# Patient Record
Sex: MALE | Race: WHITE | NOT HISPANIC OR LATINO | Employment: OTHER | URBAN - METROPOLITAN AREA
[De-identification: names, ages, dates, MRNs, and addresses within clinical notes are randomized per-mention and may not be internally consistent; named-entity substitution may affect disease eponyms.]

---

## 2023-03-24 PROBLEM — G89.29 CHRONIC RIGHT SHOULDER PAIN: Status: ACTIVE | Noted: 2023-03-24

## 2023-03-24 PROBLEM — K42.9 UMBILICAL HERNIA: Status: ACTIVE | Noted: 2023-03-24

## 2023-03-24 PROBLEM — K63.5 COLON POLYPS: Status: ACTIVE | Noted: 2023-03-24

## 2023-03-24 PROBLEM — K22.4 ESOPHAGEAL SPASM: Status: ACTIVE | Noted: 2023-03-24

## 2023-03-24 PROBLEM — I25.10 CORONARY ARTERY DISEASE: Status: ACTIVE | Noted: 2023-03-24

## 2023-03-24 PROBLEM — R91.1 PULMONARY NODULE, LEFT: Status: ACTIVE | Noted: 2023-03-24

## 2023-03-24 PROBLEM — R07.89 ATYPICAL CHEST PAIN: Status: ACTIVE | Noted: 2023-03-24

## 2023-03-24 PROBLEM — E78.2 HYPERLIPIDEMIA, MIXED: Status: ACTIVE | Noted: 2023-03-24

## 2023-03-24 PROBLEM — M25.511 CHRONIC RIGHT SHOULDER PAIN: Status: ACTIVE | Noted: 2023-03-24

## 2023-03-24 PROBLEM — M79.603 ARM PAIN: Status: ACTIVE | Noted: 2023-03-24

## 2023-03-24 PROBLEM — F17.200 TOBACCO DEPENDENCY: Status: ACTIVE | Noted: 2023-03-24

## 2023-03-24 PROBLEM — M50.10 HERNIATION OF CERVICAL INTERVERTEBRAL DISC WITH RADICULOPATHY: Status: ACTIVE | Noted: 2023-03-24

## 2023-03-24 PROBLEM — L30.9 DERMATITIS: Status: ACTIVE | Noted: 2023-03-24

## 2023-03-24 PROBLEM — R13.10 DYSPHAGIA: Status: ACTIVE | Noted: 2023-03-24

## 2023-03-24 PROBLEM — N28.1 RENAL CYST: Status: ACTIVE | Noted: 2023-03-24

## 2023-03-24 PROBLEM — J18.9 PNEUMONIA: Status: ACTIVE | Noted: 2023-03-24

## 2023-03-24 RX ORDER — BETAMETHASONE DIPROPIONATE 0.5 MG/G
OINTMENT TOPICAL 2 TIMES DAILY
COMMUNITY
Start: 2022-08-15 | End: 2023-03-29 | Stop reason: SDUPTHER

## 2023-03-24 RX ORDER — ATORVASTATIN CALCIUM 40 MG/1
40 TABLET, FILM COATED ORAL EVERY EVENING
COMMUNITY
Start: 2022-08-15 | End: 2023-03-29 | Stop reason: SDUPTHER

## 2023-03-24 RX ORDER — ASPIRIN 81 MG/1
81 TABLET ORAL EVERY MORNING
COMMUNITY
Start: 2017-10-02

## 2023-03-29 ENCOUNTER — LAB (OUTPATIENT)
Dept: LAB | Facility: LAB | Age: 63
End: 2023-03-29
Payer: COMMERCIAL

## 2023-03-29 ENCOUNTER — OFFICE VISIT (OUTPATIENT)
Dept: PRIMARY CARE | Facility: CLINIC | Age: 63
End: 2023-03-29
Payer: COMMERCIAL

## 2023-03-29 VITALS
BODY MASS INDEX: 30.96 KG/M2 | HEIGHT: 69 IN | HEART RATE: 80 BPM | OXYGEN SATURATION: 98 % | WEIGHT: 209 LBS | SYSTOLIC BLOOD PRESSURE: 112 MMHG | DIASTOLIC BLOOD PRESSURE: 82 MMHG

## 2023-03-29 DIAGNOSIS — R91.1 PULMONARY NODULE: Primary | ICD-10-CM

## 2023-03-29 DIAGNOSIS — E78.2 HYPERLIPIDEMIA, MIXED: ICD-10-CM

## 2023-03-29 DIAGNOSIS — L30.9 DERMATITIS: ICD-10-CM

## 2023-03-29 LAB
ANION GAP IN SER/PLAS: 13 MMOL/L (ref 10–20)
CALCIUM (MG/DL) IN SER/PLAS: 9.7 MG/DL (ref 8.6–10.6)
CARBON DIOXIDE, TOTAL (MMOL/L) IN SER/PLAS: 28 MMOL/L (ref 21–32)
CHLORIDE (MMOL/L) IN SER/PLAS: 102 MMOL/L (ref 98–107)
CHOLESTEROL (MG/DL) IN SER/PLAS: 124 MG/DL (ref 0–199)
CHOLESTEROL IN HDL (MG/DL) IN SER/PLAS: 31.2 MG/DL
CHOLESTEROL/HDL RATIO: 4
CREATININE (MG/DL) IN SER/PLAS: 0.92 MG/DL (ref 0.5–1.3)
ESTIMATED AVERAGE GLUCOSE FOR HBA1C: 123 MG/DL
GFR MALE: >90 ML/MIN/1.73M2
GLUCOSE (MG/DL) IN SER/PLAS: 87 MG/DL (ref 74–99)
HEMOGLOBIN A1C/HEMOGLOBIN TOTAL IN BLOOD: 5.9 %
LDL: 68 MG/DL (ref 0–99)
POTASSIUM (MMOL/L) IN SER/PLAS: 4 MMOL/L (ref 3.5–5.3)
SODIUM (MMOL/L) IN SER/PLAS: 139 MMOL/L (ref 136–145)
TRIGLYCERIDE (MG/DL) IN SER/PLAS: 122 MG/DL (ref 0–149)
UREA NITROGEN (MG/DL) IN SER/PLAS: 15 MG/DL (ref 6–23)
VLDL: 24 MG/DL (ref 0–40)

## 2023-03-29 PROCEDURE — 80061 LIPID PANEL: CPT

## 2023-03-29 PROCEDURE — 80048 BASIC METABOLIC PNL TOTAL CA: CPT

## 2023-03-29 PROCEDURE — 36415 COLL VENOUS BLD VENIPUNCTURE: CPT

## 2023-03-29 PROCEDURE — 99214 OFFICE O/P EST MOD 30 MIN: CPT | Performed by: INTERNAL MEDICINE

## 2023-03-29 PROCEDURE — 83036 HEMOGLOBIN GLYCOSYLATED A1C: CPT

## 2023-03-29 RX ORDER — KETOCONAZOLE 20 MG/G
CREAM TOPICAL
COMMUNITY
Start: 2022-10-26 | End: 2023-07-26 | Stop reason: ALTCHOICE

## 2023-03-29 RX ORDER — CLOBETASOL PROPIONATE 0.5 MG/G
CREAM TOPICAL
Qty: 60 G | Refills: 3 | Status: SHIPPED | OUTPATIENT
Start: 2023-03-29 | End: 2023-07-26 | Stop reason: ALTCHOICE

## 2023-03-29 RX ORDER — ATORVASTATIN CALCIUM 40 MG/1
40 TABLET, FILM COATED ORAL EVERY EVENING
Qty: 90 TABLET | Refills: 1 | Status: SHIPPED | OUTPATIENT
Start: 2023-03-29 | End: 2023-05-03

## 2023-03-29 RX ORDER — BETAMETHASONE DIPROPIONATE 0.5 MG/G
OINTMENT TOPICAL 2 TIMES DAILY
Qty: 45 G | Refills: 2 | Status: SHIPPED | OUTPATIENT
Start: 2023-03-29 | End: 2023-07-26 | Stop reason: ALTCHOICE

## 2023-03-29 RX ORDER — CLOBETASOL PROPIONATE 0.5 MG/G
CREAM TOPICAL
COMMUNITY
Start: 2022-10-26 | End: 2023-03-29 | Stop reason: SDUPTHER

## 2023-03-29 NOTE — PROGRESS NOTES
"Chief Complaint: Medicare Wellness Exam/Comprehensive Problem Focused Follow Up and Physical Exam    HPI:      Active Problem List      Comprehensive Medical/Surgical/Social/Family History  Past Medical History:   Diagnosis Date    Other specified postprocedural states 12/08/2022    H/O colonoscopy    Personal history of other diseases of the circulatory system     History of coronary artery disease    Personal history of other diseases of the digestive system 08/16/2022    History of umbilical hernia    Pure hypercholesterolemia, unspecified 11/26/2018    Pure hypercholesterolemia     Past Surgical History:   Procedure Laterality Date    OTHER SURGICAL HISTORY  08/12/2022    Circumcision    OTHER SURGICAL HISTORY  08/10/2022    Tonsillectomy     Social History     Social History Narrative    Not on file         Allergies and Medications  Patient has no known allergies.  Current Outpatient Medications on File Prior to Visit   Medication Sig Dispense Refill    aspirin 81 mg EC tablet Take 1 tablet (81 mg) by mouth once daily in the morning.      atorvastatin (Lipitor) 40 mg tablet Take 1 tablet (40 mg) by mouth once daily in the evening.      betamethasone dipropionate (Diprolene) 0.05 % ointment Apply topically twice a day. Sparingly to affected area(s)       No current facility-administered medications on file prior to visit.       Medications and Supplements  prescribed by me and other practitioners or clinical pharmacist (such as prescriptions, OTC's, herbal therapies and supplements) were reviewed and documented in the medical record.    Tobacco/Alcohol/Opioid use, as well as Illicit Drug Use was screened for/reviewed and documented in Social History section and medication list as appropriate  Activities of Daily Living  In your present state of health, do you have any difficulty performing the following activities?:   Preparing food and eating?: {yes/no (default no):240138::\"No\"}  Bathing yourself: {yes/no " "(default no):140031::\"No\"}  Getting dressed: {yes/no (default no):140031::\"No\"}  Using the toilet:{yes/no (default no):140031::\"No\"}  Moving around from place to place: {yes/no (default no):140031::\"No\"}  In the past year have you fallen or had a near fall?:{yes/no (default no):140031::\"No\"}    Depression Screen  (Note: if answer to either of the following is \"Yes\", then a more complete depression screening is indicated)   Q1: Over the past two weeks, have you felt down, depressed or hopeless? {yes/no (default no):140031::\"No\"}  Q2: Over the past two weeks, have you felt little interest or pleasure in doing things? {yes/no (default no):140031::\"No\"}    Current exercise habits: {exercise:19826}   Dietary issues discussed: Yes  Hearing difficulties: {yes/no (default no):140031::\"No\"}  Safe in current home environment: {yes/no free text:811418}  Visual Acuity assessed: {yes/no free text:439705}  Cognitive Impairment assessed: {yes/no free text:373318}       Advance directives  Advanced Care Planning (including a Living Will, Healthcare POA, as well as specific end of life choices and/or directives), was discussed for approximately 16 minutes with the patient and/or surrogate, voluntarily, and documented in the medical record.     Cardiac Risk Assessment  Cardiovascular risk was discussed and, if needed, lifestyle modifications recommended, including nutritional choices, exercise, and elimination of habits contributing to risk. We agreed on a plan to reduce the current cardiovascular risk based on above discussion as needed.  Aspirin use/disuse was discussed after reviewing the updated guidelines below:    Consider low dose Aspirin ( mg) use if the benefit for cardiovascular disease prevention outweighs risk for bleeding complications.   In general, low dose ASA should be considered:  In patients WITHOUT prior MI/stroke/PAD (primary prevention):   a. Age <60: Use if 10-year cardiovascular disease risk >20%, with " discussion of risks and benefits with patient  b. Age 60-<70: Use if 10-year cardiovascular disease risk >20% and low bleeding (e.g., gastrointenstinal) risk, with discussion of risks and benefits with patient  c. Age >=70: Do not use    In patients WITH prior MI/stroke/PAD (secondary prevention):   Generally use unless extremely high bleeding (e.g., gastrointenstinal) risk, with discussion of risks and benefits with patient    ROS otherwise negative aside from what was mentioned above in HPI.    Vitals  There were no vitals taken for this visit.  There is no height or weight on file to calculate BMI.  Physical Exam  Gen: Alert, NAD  HEENT:  PERRLA, EOMI, conjunctiva and sclera normal in appearance. External auditory canals/TMs normal; Oral cavity and posterior pharynx without lesions/exudate  Neck:  Supple with FROM; No masses/nodes palpable; Thyroid nontender and without nodules; No PHILIP  Respiratory:  Lungs CTAB  Cardiovascular:  Heart RRR. No M/R/G. Peripheral pulses equal bilaterally  Abdomen:  Soft, nontender, BS present throughout; No R/G/R; No HSM or masses palpated  Extremities:  FROM all extremities; Muscle strength grossly normal with good tone  Neuro:  CN II-XII intact; Reflexes 2+/2+; Gross motor and sensory intact  Skin:  No suspicious lesions present  Breast: No masses, skin lesions or nipple discharges, no axillary lymphadenopathy    Assessment and Plan:  Problem List Items Addressed This Visit    None          During the course of the visit the patient was educated and counseled about age appropriate screening and preventive services. Completed preventive screenings were documented in the chart and orders were placed for outstanding screenings/procedures as documented in the Assessment and Plan.      Patient Instructions (the written plan) was given to the patient at check out.      Sarah Kolb, [facility] complaint   ***    HPI  ***  Medications  ***    ROS  ***      There were no vitals taken  for this visit.    Physical Exam  ***    Assessment/Plan   {Assess/PlanSmartLinks:79034}

## 2023-03-29 NOTE — PROGRESS NOTES
Chief complaint   ***    HPI  ***  Medications  ***    ROS  ***      There were no vitals taken for this visit.    Physical Exam  ***    Assessment/Plan   {Assess/PlanSmartLinks:14666}

## 2023-03-29 NOTE — PROGRESS NOTES
"Chief complaint   Follow up  Med refill    HPI  Had colonoscopy upper and lower . Had esophageal stricture- no furhter symptoms.       ROS  Gen-no wt loss,wt gain,fevers,apnea at night,snoring  HEENT- no ear pain, no oral pain, no hearing loss no sinus pain or drainage  Pulm- no sob,weezing,cough,hemoptysis,productive mucous  Cardio-no cp,tyler,orthopnea,palpitations  Gi- no diarrhea,constipation,n,v,hematemesis,dysphagia,change in bowel habits  Endo- no polydipsia,polyuria,wt gain,extreme fatigue,polyphagia,blurred vision  Neuro-no ha,loc,paresthesias,transient limb weakness,acute memory loss,visual changes  Heme- no blood loss,transfusions,unusual bleeding from skin and/or mucosa  Urology- no dysuria,hematuria,urinary frequency,bladder pain  Psych- no depression,mood swings,anxiety,insomnia,hallucinations  Musculoskeletal- no joint aches / pains,muscle pains,new back pain  Vascular- no pain in calve w walkin,discolored painful toes        /82   Pulse 80   Ht 1.74 m (5' 8.5\")   Wt 94.8 kg (209 lb)   SpO2 98%   BMI 31.32 kg/m²     Physical Exam  NAD  HEENT- nl ear tm bilat,nl pharynx, mucosa moist,no icterus  Lungs clear, good AE, no wheezing  Heart-normal S1-S2 no murmur gallop rub, rhythm normal  Abdomen- soft, nontender, pos bowel sounds, Neck-no JVD, thyroid normal, no carotid bruits, no LN  Neuro alert and oriented ×3, cranial nerves II through XII normal, motor function symmetrical,sensory exam symmetrical ,gait nl  Extrem- no edema, no tenderness, no cords  Skin- 5mm irregular mole left nipple    Assessment/Plan   Diagnoses and all orders for this visit:  Dermatitis  Hyperlipidemia, mixed  Mole- see derm  Cardiac risks- high calcium score / see card/ check lipids  Abd US- risk factors    Return in 4 months    "

## 2023-03-30 NOTE — RESULT ENCOUNTER NOTE
Elevated hga1c which shows avg glucose readings as above nl . Your systems is glucose intolerant. Since you have made a lifestyle change lets continue that with weight loss. Another 15 pounds and repeat hga1c in 3 months. Congratulations on your progress , good news.

## 2023-04-14 ENCOUNTER — DOCUMENTATION (OUTPATIENT)
Dept: PRIMARY CARE | Facility: CLINIC | Age: 63
End: 2023-04-14
Payer: COMMERCIAL

## 2023-05-03 ENCOUNTER — PATIENT MESSAGE (OUTPATIENT)
Dept: PRIMARY CARE | Facility: CLINIC | Age: 63
End: 2023-05-03
Payer: COMMERCIAL

## 2023-05-03 DIAGNOSIS — E78.2 HYPERLIPIDEMIA, MIXED: Primary | ICD-10-CM

## 2023-05-03 RX ORDER — ATORVASTATIN CALCIUM 80 MG/1
80 TABLET, FILM COATED ORAL DAILY
Qty: 90 TABLET | Refills: 1 | Status: SHIPPED | OUTPATIENT
Start: 2023-05-03 | End: 2023-11-17

## 2023-05-26 ENCOUNTER — HOSPITAL ENCOUNTER (OUTPATIENT)
Dept: DATA CONVERSION | Facility: HOSPITAL | Age: 63
End: 2023-05-26
Attending: INTERNAL MEDICINE | Admitting: INTERNAL MEDICINE
Payer: COMMERCIAL

## 2023-05-26 DIAGNOSIS — I25.82 CHRONIC TOTAL OCCLUSION OF CORONARY ARTERY: ICD-10-CM

## 2023-05-26 DIAGNOSIS — I25.10 ATHEROSCLEROTIC HEART DISEASE OF NATIVE CORONARY ARTERY WITHOUT ANGINA PECTORIS: ICD-10-CM

## 2023-05-26 DIAGNOSIS — Z87.891 PERSONAL HISTORY OF NICOTINE DEPENDENCE: ICD-10-CM

## 2023-05-26 DIAGNOSIS — R94.39 ABNORMAL RESULT OF OTHER CARDIOVASCULAR FUNCTION STUDY: ICD-10-CM

## 2023-05-26 DIAGNOSIS — E78.5 HYPERLIPIDEMIA, UNSPECIFIED: ICD-10-CM

## 2023-05-26 DIAGNOSIS — I10 ESSENTIAL (PRIMARY) HYPERTENSION: ICD-10-CM

## 2023-05-26 LAB
ANION GAP IN SER/PLAS: 12 MMOL/L (ref 10–20)
CALCIUM (MG/DL) IN SER/PLAS: 9.7 MG/DL (ref 8.6–10.3)
CARBON DIOXIDE, TOTAL (MMOL/L) IN SER/PLAS: 29 MMOL/L (ref 21–32)
CHLORIDE (MMOL/L) IN SER/PLAS: 101 MMOL/L (ref 98–107)
CREATININE (MG/DL) IN SER/PLAS: 1.12 MG/DL (ref 0.5–1.3)
ERYTHROCYTE DISTRIBUTION WIDTH (RATIO) BY AUTOMATED COUNT: 13.2 % (ref 11.5–14.5)
ERYTHROCYTE MEAN CORPUSCULAR HEMOGLOBIN CONCENTRATION (G/DL) BY AUTOMATED: 32.6 G/DL (ref 32–36)
ERYTHROCYTE MEAN CORPUSCULAR VOLUME (FL) BY AUTOMATED COUNT: 93 FL (ref 80–100)
ERYTHROCYTES (10*6/UL) IN BLOOD BY AUTOMATED COUNT: 4.62 X10E12/L (ref 4.5–5.9)
GFR MALE: 74 ML/MIN/1.73M2
GLUCOSE (MG/DL) IN SER/PLAS: 96 MG/DL (ref 74–99)
HEMATOCRIT (%) IN BLOOD BY AUTOMATED COUNT: 43 % (ref 41–52)
HEMOGLOBIN (G/DL) IN BLOOD: 14 G/DL (ref 13.5–17.5)
LEUKOCYTES (10*3/UL) IN BLOOD BY AUTOMATED COUNT: 5.5 X10E9/L (ref 4.4–11.3)
PLATELETS (10*3/UL) IN BLOOD AUTOMATED COUNT: 230 X10E9/L (ref 150–450)
POTASSIUM (MMOL/L) IN SER/PLAS: 4.4 MMOL/L (ref 3.5–5.3)
SODIUM (MMOL/L) IN SER/PLAS: 138 MMOL/L (ref 136–145)
UREA NITROGEN (MG/DL) IN SER/PLAS: 12 MG/DL (ref 6–23)

## 2023-06-15 LAB
ACTIVATED PARTIAL THROMBOPLASTIN TIME IN PPP BY COAGULATION ASSAY: 34 SEC (ref 26–39)
ALANINE AMINOTRANSFERASE (SGPT) (U/L) IN SER/PLAS: 23 U/L (ref 10–52)
ALBUMIN (G/DL) IN SER/PLAS: 4.3 G/DL (ref 3.4–5)
ALKALINE PHOSPHATASE (U/L) IN SER/PLAS: 80 U/L (ref 33–136)
ANION GAP IN SER/PLAS: 12 MMOL/L (ref 10–20)
APPEARANCE, URINE: CLEAR
ASPARTATE AMINOTRANSFERASE (SGOT) (U/L) IN SER/PLAS: 19 U/L (ref 9–39)
BASOPHILS (10*3/UL) IN BLOOD BY AUTOMATED COUNT: 0.05 X10E9/L (ref 0–0.1)
BASOPHILS/100 LEUKOCYTES IN BLOOD BY AUTOMATED COUNT: 0.9 % (ref 0–2)
BILIRUBIN TOTAL (MG/DL) IN SER/PLAS: 0.6 MG/DL (ref 0–1.2)
BILIRUBIN, URINE: NEGATIVE
BLOOD, URINE: NEGATIVE
C REACTIVE PROTEIN (MG/L) IN SER/PLAS: 0.1 MG/DL
CALCIUM (MG/DL) IN SER/PLAS: 9.3 MG/DL (ref 8.6–10.3)
CARBON DIOXIDE, TOTAL (MMOL/L) IN SER/PLAS: 26 MMOL/L (ref 21–32)
CHLORIDE (MMOL/L) IN SER/PLAS: 103 MMOL/L (ref 98–107)
COLOR, URINE: ABNORMAL
CREATININE (MG/DL) IN SER/PLAS: 0.95 MG/DL (ref 0.5–1.3)
EOSINOPHILS (10*3/UL) IN BLOOD BY AUTOMATED COUNT: 0.1 X10E9/L (ref 0–0.7)
EOSINOPHILS/100 LEUKOCYTES IN BLOOD BY AUTOMATED COUNT: 1.8 % (ref 0–6)
ERYTHROCYTE DISTRIBUTION WIDTH (RATIO) BY AUTOMATED COUNT: 12.9 % (ref 11.5–14.5)
ERYTHROCYTE MEAN CORPUSCULAR HEMOGLOBIN CONCENTRATION (G/DL) BY AUTOMATED: 33.3 G/DL (ref 32–36)
ERYTHROCYTE MEAN CORPUSCULAR VOLUME (FL) BY AUTOMATED COUNT: 91 FL (ref 80–100)
ERYTHROCYTES (10*6/UL) IN BLOOD BY AUTOMATED COUNT: 4.45 X10E12/L (ref 4.5–5.9)
GFR MALE: 90 ML/MIN/1.73M2
GLUCOSE (MG/DL) IN SER/PLAS: 82 MG/DL (ref 74–99)
GLUCOSE, URINE: NEGATIVE MG/DL
HEMATOCRIT (%) IN BLOOD BY AUTOMATED COUNT: 40.5 % (ref 41–52)
HEMOGLOBIN (G/DL) IN BLOOD: 13.5 G/DL (ref 13.5–17.5)
IMMATURE GRANULOCYTES/100 LEUKOCYTES IN BLOOD BY AUTOMATED COUNT: 0.2 % (ref 0–0.9)
INR IN PPP BY COAGULATION ASSAY: 1 (ref 0.9–1.1)
KETONES, URINE: NEGATIVE MG/DL
LEUKOCYTE ESTERASE, URINE: NEGATIVE
LEUKOCYTES (10*3/UL) IN BLOOD BY AUTOMATED COUNT: 5.6 X10E9/L (ref 4.4–11.3)
LYMPHOCYTES (10*3/UL) IN BLOOD BY AUTOMATED COUNT: 2.47 X10E9/L (ref 1.2–4.8)
LYMPHOCYTES/100 LEUKOCYTES IN BLOOD BY AUTOMATED COUNT: 43.8 % (ref 13–44)
MONOCYTES (10*3/UL) IN BLOOD BY AUTOMATED COUNT: 0.58 X10E9/L (ref 0.1–1)
MONOCYTES/100 LEUKOCYTES IN BLOOD BY AUTOMATED COUNT: 10.3 % (ref 2–10)
NEUTROPHILS (10*3/UL) IN BLOOD BY AUTOMATED COUNT: 2.43 X10E9/L (ref 1.2–7.7)
NEUTROPHILS/100 LEUKOCYTES IN BLOOD BY AUTOMATED COUNT: 43 % (ref 40–80)
NITRITE, URINE: NEGATIVE
PH, URINE: 6 (ref 5–8)
PLATELETS (10*3/UL) IN BLOOD AUTOMATED COUNT: 210 X10E9/L (ref 150–450)
POTASSIUM (MMOL/L) IN SER/PLAS: 4 MMOL/L (ref 3.5–5.3)
PROTEIN TOTAL: 7.2 G/DL (ref 6.4–8.2)
PROTEIN, URINE: NEGATIVE MG/DL
PROTHROMBIN TIME (PT) IN PPP BY COAGULATION ASSAY: 11.6 SEC (ref 9.8–13.4)
SODIUM (MMOL/L) IN SER/PLAS: 137 MMOL/L (ref 136–145)
SPECIFIC GRAVITY, URINE: 1 (ref 1–1.03)
UREA NITROGEN (MG/DL) IN SER/PLAS: 12 MG/DL (ref 6–23)
UROBILINOGEN, URINE: <2 MG/DL (ref 0–1.9)

## 2023-06-17 LAB — STAPH/MRSA SCREEN, CULTURE: NORMAL

## 2023-06-25 ENCOUNTER — TELEPHONE (OUTPATIENT)
Dept: PRIMARY CARE | Facility: CLINIC | Age: 63
End: 2023-06-25
Payer: COMMERCIAL

## 2023-06-25 DIAGNOSIS — F41.9 ANXIETY: Primary | ICD-10-CM

## 2023-06-25 RX ORDER — LORAZEPAM 1 MG/1
1 TABLET ORAL 2 TIMES DAILY PRN
Qty: 30 TABLET | Refills: 3 | Status: SHIPPED | OUTPATIENT
Start: 2023-06-25 | End: 2023-06-26 | Stop reason: SDUPTHER

## 2023-06-26 DIAGNOSIS — F41.9 ANXIETY: Primary | ICD-10-CM

## 2023-06-26 RX ORDER — LORAZEPAM 1 MG/1
1 TABLET ORAL 2 TIMES DAILY PRN
Qty: 30 TABLET | Refills: 3 | Status: SHIPPED | OUTPATIENT
Start: 2023-06-26 | End: 2023-07-26 | Stop reason: ALTCHOICE

## 2023-07-10 ENCOUNTER — DOCUMENTATION (OUTPATIENT)
Dept: PRIMARY CARE | Facility: CLINIC | Age: 63
End: 2023-07-10
Payer: COMMERCIAL

## 2023-07-10 ENCOUNTER — PATIENT OUTREACH (OUTPATIENT)
Dept: CARE COORDINATION | Facility: CLINIC | Age: 63
End: 2023-07-10
Payer: COMMERCIAL

## 2023-07-10 RX ORDER — OXYCODONE HYDROCHLORIDE 5 MG/1
TABLET ORAL EVERY 6 HOURS PRN
COMMUNITY
Start: 2023-07-09 | End: 2023-07-11

## 2023-07-10 RX ORDER — FUROSEMIDE 20 MG/1
1 TABLET ORAL DAILY
Qty: 6 TABLET | Refills: 0 | COMMUNITY
Start: 2023-07-09 | End: 2023-07-26 | Stop reason: ALTCHOICE

## 2023-07-10 RX ORDER — METOPROLOL TARTRATE 25 MG/1
25 TABLET, FILM COATED ORAL 2 TIMES DAILY
COMMUNITY
Start: 2023-05-19 | End: 2024-02-21 | Stop reason: ALTCHOICE

## 2023-07-10 RX ORDER — POTASSIUM CHLORIDE 750 MG/1
10 TABLET, FILM COATED, EXTENDED RELEASE ORAL DAILY
COMMUNITY
Start: 2023-07-09 | End: 2023-07-15

## 2023-07-10 RX ORDER — IRON POLYSACCHARIDE COMPLEX 150 MG
150 CAPSULE ORAL DAILY
COMMUNITY
Start: 2023-07-09 | End: 2023-08-07

## 2023-07-10 RX ORDER — MULTIVIT-MIN/IRON FUM/FOLIC AC 7.5 MG-4
1 TABLET ORAL DAILY
Qty: 29 TABLET | Refills: 0 | COMMUNITY
Start: 2023-07-09 | End: 2023-08-07

## 2023-07-10 RX ORDER — ISOSORBIDE MONONITRATE 30 MG/1
30 TABLET, EXTENDED RELEASE ORAL DAILY
COMMUNITY
Start: 2023-07-09 | End: 2024-02-21 | Stop reason: ALTCHOICE

## 2023-07-10 RX ORDER — AMOXICILLIN 250 MG
1 CAPSULE ORAL DAILY
COMMUNITY
Start: 2023-07-09 | End: 2023-07-26 | Stop reason: ALTCHOICE

## 2023-07-10 NOTE — PROGRESS NOTES
Discharge Facility:  [facility]  Discharge DiagnosisCABG x3   Admission Date: 7/5/23  Discharge Date: 7/9/23    PCP Appointment Date: 7/26/23  Specialist Appointment Date: 7/20/23  Hospital Encounter and Summary: Not available  See discharge assessment below for further details    Engagement  Call Start Time: 1255 (7/10/2023  1:07 PM)    Medications  Medications reviewed with patient/caregiver?: Yes (7/10/2023  1:07 PM)  Is the patient having any side effects they believe may be caused by any medication additions or changes?: No (7/10/2023  1:07 PM)  Does the patient have all medications ordered at discharge?: No (7/10/2023  1:07 PM)  What is keeping the patient from filling the prescriptions?: -- (pending lasix and isosorbide) (7/10/2023  1:07 PM)  Prescription Comments: He received all medications except for isosorbide and lasix, will be getting those this afternoon (7/10/2023  1:07 PM)  Is the patient taking all medications as directed (includes completed medication regime)?: Yes (7/10/2023  1:07 PM)    Appointments  Does the patient have a primary care provider?: Yes (7/10/2023  1:07 PM)  Care Management Interventions: Verified appointment date/time/provider (7/10/2023  1:07 PM)  Has the patient kept scheduled appointments due by today?: Yes (7/10/2023  1:07 PM)  Care Management Interventions: Educated on importance of keeping appointment (7/10/2023  1:07 PM)    Self Management  What is the home health agency?: [facility] (7/10/2023  1:07 PM)  Has home health visited the patient within 72 hours of discharge?: Call prior to 72 hours (7/10/2023  1:07 PM)    Patient Teaching  Does the patient have access to their discharge instructions?: Yes (7/10/2023  1:07 PM)  Care Management Interventions: Reviewed instructions with patient (7/10/2023  1:07 PM)  What is the patient's perception of their health status since discharge?: Improving (7/10/2023  1:07 PM)  Is the patient/caregiver able to teach back the hierarchy of who to  call/visit for symptoms/problems? PCP, Specialist, Home Health nurse, Urgent Care, ED, 911: Yes (7/10/2023  1:07 PM)  Patient/Caregiver Education Comments: Aware of activity limitations and no questions on discharge instructions (7/10/2023  1:07 PM)    Wrap Up  Wrap Up Additional Comments: will keep appt with Dr Kolb for hosp fu (7/10/2023  1:07 PM)  Call End Time: 1314 (7/10/2023  1:07 PM)

## 2023-07-11 LAB
ERYTHROCYTE DISTRIBUTION WIDTH (RATIO) BY AUTOMATED COUNT: 13.9 % (ref 11.5–14.5)
ERYTHROCYTE MEAN CORPUSCULAR HEMOGLOBIN CONCENTRATION (G/DL) BY AUTOMATED: 31.6 G/DL (ref 32–36)
ERYTHROCYTE MEAN CORPUSCULAR VOLUME (FL) BY AUTOMATED COUNT: 94 FL (ref 80–100)
ERYTHROCYTES (10*6/UL) IN BLOOD BY AUTOMATED COUNT: 2.18 X10E12/L (ref 4.5–5.9)
HEMATOCRIT (%) IN BLOOD BY AUTOMATED COUNT: 20.6 % (ref 41–52)
HEMOGLOBIN (G/DL) IN BLOOD: 6.5 G/DL (ref 13.5–17.5)
LEUKOCYTES (10*3/UL) IN BLOOD BY AUTOMATED COUNT: 9.5 X10E9/L (ref 4.4–11.3)
NRBC (PER 100 WBCS) BY AUTOMATED COUNT: 1.7 /100 WBC (ref 0–0)
PLATELETS (10*3/UL) IN BLOOD AUTOMATED COUNT: 269 X10E9/L (ref 150–450)

## 2023-07-13 ENCOUNTER — DOCUMENTATION (OUTPATIENT)
Dept: PRIMARY CARE | Facility: CLINIC | Age: 63
End: 2023-07-13
Payer: COMMERCIAL

## 2023-07-13 ENCOUNTER — PATIENT OUTREACH (OUTPATIENT)
Dept: CARE COORDINATION | Facility: CLINIC | Age: 63
End: 2023-07-13
Payer: COMMERCIAL

## 2023-07-13 NOTE — PROGRESS NOTES
Discharge Facility: [facility]  Discharge Diagnosis: post op anemia  Admission Date: 7/11/23  Discharge Date: 7/12/23    PCP Appointment Date: 7/26/23  Hospital Encounter and Summary: Not available  See discharge assessment below for further details    Engagement  Call Start Time: 1520 (7/13/2023  3:22 PM)    Medications  Medications reviewed with patient/caregiver?: Yes (7/13/2023  3:22 PM)  Is the patient having any side effects they believe may be caused by any medication additions or changes?: No (7/13/2023  3:22 PM)  Does the patient have all medications ordered at discharge?: Yes (7/13/2023  3:22 PM)  What is keeping the patient from filling the prescriptions?: -- (pending lasix and isosorbide) (7/10/2023  1:07 PM)  Prescription Comments: Stopped isosorbide (7/13/2023  3:22 PM)  Is the patient taking all medications as directed (includes completed medication regime)?: Yes (7/13/2023  3:22 PM)    Appointments  Does the patient have a primary care provider?: Yes (7/13/2023  3:22 PM)  Care Management Interventions: Verified appointment date/time/provider (7/13/2023  3:22 PM)  Has the patient kept scheduled appointments due by today?: Yes (7/13/2023  3:22 PM)  Care Management Interventions: Educated on importance of keeping appointment (7/13/2023  3:22 PM)    Self Management  What is the home health agency?: [facility] (7/13/2023  3:22 PM)  Has home health visited the patient within 72 hours of discharge?: Call prior to 72 hours (7/13/2023  3:22 PM)    Patient Teaching  Does the patient have access to their discharge instructions?: Yes (7/13/2023  3:22 PM)  Care Management Interventions: Reviewed instructions with patient (7/13/2023  3:22 PM)  What is the patient's perception of their health status since discharge?: Returned to baseline/stable (7/13/2023  3:22 PM)  Is the patient/caregiver able to teach back the hierarchy of who to call/visit for symptoms/problems? PCP, Specialist, Home Health nurse, Urgent Care, ED,  911: Yes (7/13/2023  3:22 PM)  Patient/Caregiver Education Comments: Aware of activity limitations and no questions on discharge instructions (7/10/2023  1:07 PM)    Wrap Up  Wrap Up Additional Comments: Advised to call cardiology with any new symptoms of shortness of breath, swelling, weight gain (7/13/2023  3:22 PM)  Call End Time: 1525 (7/13/2023  3:22 PM)

## 2023-07-26 ENCOUNTER — LAB (OUTPATIENT)
Dept: LAB | Facility: LAB | Age: 63
End: 2023-07-26
Payer: COMMERCIAL

## 2023-07-26 ENCOUNTER — OFFICE VISIT (OUTPATIENT)
Dept: PRIMARY CARE | Facility: CLINIC | Age: 63
End: 2023-07-26
Payer: COMMERCIAL

## 2023-07-26 VITALS
BODY MASS INDEX: 29.7 KG/M2 | WEIGHT: 196 LBS | OXYGEN SATURATION: 98 % | HEART RATE: 64 BPM | HEIGHT: 68 IN | SYSTOLIC BLOOD PRESSURE: 110 MMHG | DIASTOLIC BLOOD PRESSURE: 64 MMHG

## 2023-07-26 DIAGNOSIS — I25.700 ATHEROSCLEROSIS OF CORONARY ARTERY BYPASS GRAFT OF NATIVE HEART WITH UNSTABLE ANGINA PECTORIS (MULTI): Primary | ICD-10-CM

## 2023-07-26 DIAGNOSIS — D64.9 ANEMIA, UNSPECIFIED TYPE: ICD-10-CM

## 2023-07-26 PROBLEM — B34.9 VIRAL ILLNESS: Status: RESOLVED | Noted: 2023-07-26 | Resolved: 2023-07-26

## 2023-07-26 PROBLEM — I25.110: Status: ACTIVE | Noted: 2023-03-24

## 2023-07-26 PROBLEM — J18.9 PNEUMONIA: Status: RESOLVED | Noted: 2023-03-24 | Resolved: 2023-07-26

## 2023-07-26 PROBLEM — I65.29 CAROTID STENOSIS: Status: ACTIVE | Noted: 2023-07-26

## 2023-07-26 PROBLEM — L30.9 DERMATITIS: Status: RESOLVED | Noted: 2023-03-24 | Resolved: 2023-07-26

## 2023-07-26 PROBLEM — F17.200 SMOKER: Status: ACTIVE | Noted: 2023-07-26

## 2023-07-26 PROBLEM — Z95.1 S/P CABG (CORONARY ARTERY BYPASS GRAFT): Status: ACTIVE | Noted: 2023-07-26

## 2023-07-26 PROBLEM — M79.603 ARM PAIN: Status: RESOLVED | Noted: 2023-03-24 | Resolved: 2023-07-26

## 2023-07-26 PROBLEM — F17.200 TOBACCO DEPENDENCY: Status: RESOLVED | Noted: 2023-03-24 | Resolved: 2023-07-26

## 2023-07-26 PROBLEM — F17.200 SMOKER: Status: RESOLVED | Noted: 2023-07-26 | Resolved: 2023-07-26

## 2023-07-26 LAB
BASOPHILS (10*3/UL) IN BLOOD BY AUTOMATED COUNT: 0.03 X10E9/L (ref 0–0.1)
BASOPHILS/100 LEUKOCYTES IN BLOOD BY AUTOMATED COUNT: 0.7 % (ref 0–2)
EOSINOPHILS (10*3/UL) IN BLOOD BY AUTOMATED COUNT: 0.13 X10E9/L (ref 0–0.7)
EOSINOPHILS/100 LEUKOCYTES IN BLOOD BY AUTOMATED COUNT: 3.1 % (ref 0–6)
ERYTHROCYTE DISTRIBUTION WIDTH (RATIO) BY AUTOMATED COUNT: 15.7 % (ref 11.5–14.5)
ERYTHROCYTE MEAN CORPUSCULAR HEMOGLOBIN CONCENTRATION (G/DL) BY AUTOMATED: 30.7 G/DL (ref 32–36)
ERYTHROCYTE MEAN CORPUSCULAR VOLUME (FL) BY AUTOMATED COUNT: 93 FL (ref 80–100)
ERYTHROCYTES (10*6/UL) IN BLOOD BY AUTOMATED COUNT: 3.63 X10E12/L (ref 4.5–5.9)
HEMATOCRIT (%) IN BLOOD BY AUTOMATED COUNT: 33.9 % (ref 41–52)
HEMOGLOBIN (G/DL) IN BLOOD: 10.4 G/DL (ref 13.5–17.5)
IMMATURE GRANULOCYTES/100 LEUKOCYTES IN BLOOD BY AUTOMATED COUNT: 0.5 % (ref 0–0.9)
LEUKOCYTES (10*3/UL) IN BLOOD BY AUTOMATED COUNT: 4.1 X10E9/L (ref 4.4–11.3)
LYMPHOCYTES (10*3/UL) IN BLOOD BY AUTOMATED COUNT: 1.3 X10E9/L (ref 1.2–4.8)
LYMPHOCYTES/100 LEUKOCYTES IN BLOOD BY AUTOMATED COUNT: 31.4 % (ref 13–44)
MONOCYTES (10*3/UL) IN BLOOD BY AUTOMATED COUNT: 0.57 X10E9/L (ref 0.1–1)
MONOCYTES/100 LEUKOCYTES IN BLOOD BY AUTOMATED COUNT: 13.8 % (ref 2–10)
NEUTROPHILS (10*3/UL) IN BLOOD BY AUTOMATED COUNT: 2.09 X10E9/L (ref 1.2–7.7)
NEUTROPHILS/100 LEUKOCYTES IN BLOOD BY AUTOMATED COUNT: 50.5 % (ref 40–80)
NRBC (PER 100 WBCS) BY AUTOMATED COUNT: 0 /100 WBC (ref 0–0)
PLATELETS (10*3/UL) IN BLOOD AUTOMATED COUNT: 471 X10E9/L (ref 150–450)

## 2023-07-26 PROCEDURE — 99214 OFFICE O/P EST MOD 30 MIN: CPT | Performed by: INTERNAL MEDICINE

## 2023-07-26 PROCEDURE — 36415 COLL VENOUS BLD VENIPUNCTURE: CPT

## 2023-07-26 PROCEDURE — 85025 COMPLETE CBC W/AUTO DIFF WBC: CPT

## 2023-07-26 RX ORDER — ONDANSETRON 4 MG/1
TABLET, FILM COATED ORAL
COMMUNITY
Start: 2023-07-09 | End: 2023-07-26 | Stop reason: ALTCHOICE

## 2023-07-26 RX ORDER — ACETAMINOPHEN 500 MG
500 TABLET ORAL 2 TIMES DAILY
COMMUNITY
Start: 2023-07-11

## 2023-07-26 RX ORDER — VITAMIN E (DL,TOCOPHERYL ACET) 90 MG
CAPSULE ORAL
COMMUNITY
Start: 2023-07-11

## 2023-07-26 RX ORDER — MULTIVIT-MIN/FERROUS SULFATE 4.5 MG
1 TABLET ORAL DAILY
COMMUNITY
Start: 2023-07-09 | End: 2023-07-26 | Stop reason: SDUPTHER

## 2023-07-26 NOTE — PROGRESS NOTES
"Subjective   Patient ID: Maynor Barth is a 62 y.o. male who presents for No chief complaint on file..  HPI  3 wks ago had cabg, then readmitted for severe anemia  Here with wife. Now able to sleep and walking more. Appetite is good. No longer smoking or drinking.                   ROS:      General: denies fever/chills/weight loss      Head: denies HA/trauma/masses/dizziness      Eyes: denies vision change/loss of vision/blurry vision/diplopia/eye pain      Ears: denies hearing loss/tinnitus/otalgia/otorrhea      Nose: denies nasal drainage/anosmia      Throat: denies dysphagia/odynophagia      Lymphatics: denies lymph node swelling      Cardiac: denies CP/palpitations/orthopnea/PND      Pulmonary: denies dyspnea/cough/wheezing      GI: denies abd pain/n/v/diarrhea/melena/hematochezia/hematemesis      : denies dysuria/hematuria/change frequency      Genital: denies genital discharge/lesions      Skin: denies rashes/lesions/masses      MSK: denies weakness/swelling/edema/gait imbalance/pain      Neuro: denies paresthesias/seizures/dysarthria      Psych: denies depression/anxiety/suicidal or homicidal ideations            Objective   /64   Pulse 64   Ht 1.727 m (5' 8\")   Wt 88.9 kg (196 lb)   SpO2 98%   BMI 29.80 kg/m²      Physical Exam:     General: AO3, NAD        Pulm: CTA b/l, no wheeze/rhonchi/rales. nonlabored     Cardiac: RRR +s1s2, no m/r/g.      GI: soft, NT/ND. Normoactive Bsx4. No rebound/guarding.     Rectal: no examined     MSK: 5/5 strength UE LE. No edema/clubbing/cyanosis     Skin: no rashes/lesions     Vascular: 2+ palp DP PT radials b/l. Negative carotid bruit     Neuro: CNII-XII intact. No focal deficits. Reflexes 2/4 brachioradialis bicep tricep patellar achilles. Finger to nose intact.     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 Will ask about imdur duration.   No new issues.    Sarah Kolb MD

## 2023-07-28 ENCOUNTER — PATIENT OUTREACH (OUTPATIENT)
Dept: CARE COORDINATION | Facility: CLINIC | Age: 63
End: 2023-07-28
Payer: COMMERCIAL

## 2023-07-28 NOTE — PROGRESS NOTES
Call regarding appt. with PCP on 7/26/23 after hospitalization.  At time of outreach call the patient feels as if their condition has improved since last visit.  Reviewed the PCP appointment with the pt and addressed any questions or concerns. Reviewed labwork with patient and explained his lower counts of his RBC, hemoglobin and hematocrit and other counts are expected due to post op anemia and are trending in the desired direction.

## 2023-08-02 LAB
CHOLESTEROL (MG/DL) IN SER/PLAS: 73 MG/DL (ref 0–199)
CHOLESTEROL IN HDL (MG/DL) IN SER/PLAS: 28.5 MG/DL
CHOLESTEROL/HDL RATIO: 2.6
LDL: 26 MG/DL (ref 0–99)
TRIGLYCERIDE (MG/DL) IN SER/PLAS: 93 MG/DL (ref 0–149)
VLDL: 19 MG/DL (ref 0–40)

## 2023-08-31 ENCOUNTER — PATIENT OUTREACH (OUTPATIENT)
Dept: CARE COORDINATION | Facility: CLINIC | Age: 63
End: 2023-08-31
Payer: COMMERCIAL

## 2023-09-07 VITALS — BODY MASS INDEX: 30.37 KG/M2 | HEIGHT: 68 IN | WEIGHT: 200.4 LBS

## 2023-09-12 DIAGNOSIS — D50.9 IRON DEFICIENCY ANEMIA, UNSPECIFIED IRON DEFICIENCY ANEMIA TYPE: Primary | ICD-10-CM

## 2023-09-17 DIAGNOSIS — R19.5 STOOL COLOR ABNORMAL: Primary | ICD-10-CM

## 2023-09-19 ENCOUNTER — LAB (OUTPATIENT)
Dept: LAB | Facility: LAB | Age: 63
End: 2023-09-19
Payer: COMMERCIAL

## 2023-09-19 DIAGNOSIS — R19.5 STOOL COLOR ABNORMAL: ICD-10-CM

## 2023-09-19 DIAGNOSIS — D50.9 IRON DEFICIENCY ANEMIA, UNSPECIFIED IRON DEFICIENCY ANEMIA TYPE: ICD-10-CM

## 2023-09-19 LAB
ALANINE AMINOTRANSFERASE (SGPT) (U/L) IN SER/PLAS: 23 U/L (ref 10–52)
ALBUMIN (G/DL) IN SER/PLAS: 4.5 G/DL (ref 3.4–5)
ALKALINE PHOSPHATASE (U/L) IN SER/PLAS: 74 U/L (ref 33–136)
AMYLASE (U/L) IN SER/PLAS: 55 U/L (ref 29–103)
ANION GAP IN SER/PLAS: 12 MMOL/L (ref 10–20)
ASPARTATE AMINOTRANSFERASE (SGOT) (U/L) IN SER/PLAS: 20 U/L (ref 9–39)
BASOPHILS (10*3/UL) IN BLOOD BY AUTOMATED COUNT: 0.04 X10E9/L (ref 0–0.1)
BASOPHILS/100 LEUKOCYTES IN BLOOD BY AUTOMATED COUNT: 0.8 % (ref 0–2)
BILIRUBIN TOTAL (MG/DL) IN SER/PLAS: 0.4 MG/DL (ref 0–1.2)
CALCIUM (MG/DL) IN SER/PLAS: 9.5 MG/DL (ref 8.6–10.6)
CARBON DIOXIDE, TOTAL (MMOL/L) IN SER/PLAS: 27 MMOL/L (ref 21–32)
CHLORIDE (MMOL/L) IN SER/PLAS: 105 MMOL/L (ref 98–107)
CREATININE (MG/DL) IN SER/PLAS: 0.91 MG/DL (ref 0.5–1.3)
EOSINOPHILS (10*3/UL) IN BLOOD BY AUTOMATED COUNT: 0.09 X10E9/L (ref 0–0.7)
EOSINOPHILS/100 LEUKOCYTES IN BLOOD BY AUTOMATED COUNT: 1.8 % (ref 0–6)
ERYTHROCYTE DISTRIBUTION WIDTH (RATIO) BY AUTOMATED COUNT: 15.9 % (ref 11.5–14.5)
ERYTHROCYTE MEAN CORPUSCULAR HEMOGLOBIN CONCENTRATION (G/DL) BY AUTOMATED: 31.5 G/DL (ref 32–36)
ERYTHROCYTE MEAN CORPUSCULAR VOLUME (FL) BY AUTOMATED COUNT: 88 FL (ref 80–100)
ERYTHROCYTES (10*6/UL) IN BLOOD BY AUTOMATED COUNT: 4.57 X10E12/L (ref 4.5–5.9)
GFR MALE: >90 ML/MIN/1.73M2
GLUCOSE (MG/DL) IN SER/PLAS: 101 MG/DL (ref 74–99)
HEMATOCRIT (%) IN BLOOD BY AUTOMATED COUNT: 40 % (ref 41–52)
HEMOGLOBIN (G/DL) IN BLOOD: 12.6 G/DL (ref 13.5–17.5)
IMMATURE GRANULOCYTES/100 LEUKOCYTES IN BLOOD BY AUTOMATED COUNT: 0.2 % (ref 0–0.9)
LEUKOCYTES (10*3/UL) IN BLOOD BY AUTOMATED COUNT: 5 X10E9/L (ref 4.4–11.3)
LIPASE (U/L) IN SER/PLAS: 58 U/L (ref 9–82)
LYMPHOCYTES (10*3/UL) IN BLOOD BY AUTOMATED COUNT: 2.04 X10E9/L (ref 1.2–4.8)
LYMPHOCYTES/100 LEUKOCYTES IN BLOOD BY AUTOMATED COUNT: 41.1 % (ref 13–44)
MONOCYTES (10*3/UL) IN BLOOD BY AUTOMATED COUNT: 0.52 X10E9/L (ref 0.1–1)
MONOCYTES/100 LEUKOCYTES IN BLOOD BY AUTOMATED COUNT: 10.5 % (ref 2–10)
NEUTROPHILS (10*3/UL) IN BLOOD BY AUTOMATED COUNT: 2.26 X10E9/L (ref 1.2–7.7)
NEUTROPHILS/100 LEUKOCYTES IN BLOOD BY AUTOMATED COUNT: 45.6 % (ref 40–80)
NRBC (PER 100 WBCS) BY AUTOMATED COUNT: 0 /100 WBC (ref 0–0)
PLATELETS (10*3/UL) IN BLOOD AUTOMATED COUNT: 209 X10E9/L (ref 150–450)
POTASSIUM (MMOL/L) IN SER/PLAS: 4.3 MMOL/L (ref 3.5–5.3)
PROTEIN TOTAL: 6.8 G/DL (ref 6.4–8.2)
SODIUM (MMOL/L) IN SER/PLAS: 140 MMOL/L (ref 136–145)
UREA NITROGEN (MG/DL) IN SER/PLAS: 15 MG/DL (ref 6–23)

## 2023-09-19 PROCEDURE — 82150 ASSAY OF AMYLASE: CPT

## 2023-09-19 PROCEDURE — 80053 COMPREHEN METABOLIC PANEL: CPT

## 2023-09-19 PROCEDURE — 36415 COLL VENOUS BLD VENIPUNCTURE: CPT

## 2023-09-19 PROCEDURE — 83690 ASSAY OF LIPASE: CPT

## 2023-09-19 PROCEDURE — 85025 COMPLETE CBC W/AUTO DIFF WBC: CPT

## 2023-09-20 DIAGNOSIS — R19.5 STOOL COLOR ABNORMAL: Primary | ICD-10-CM

## 2023-09-20 DIAGNOSIS — E78.2 HYPERLIPIDEMIA, MIXED: ICD-10-CM

## 2023-09-20 DIAGNOSIS — L30.9 DERMATITIS: ICD-10-CM

## 2023-09-30 NOTE — H&P
History & Physical Reviewed:   I have reviewed the History and Physical dated:  26-Apr-2023   History and Physical reviewed and relevant findings noted. Patient examined to review pertinent physical  findings.: No significant changes   Home Medications Reviewed: no changes noted   Allergies Reviewed: no changes noted       Airway/Sedation Assessment:  ·  Emotional Status calm   ·  Neurologic alert & oriented x 3   ·  Respiratory clear to auscultation   ·  Cardiovascular rhythm & rate regular   ·  GI/ soft, nontender     · Pulses present: Pedal Left, Pedal Right, Radial Left, Radial Right     ·  Mouth Opening OK yes   ·  Neck Flexibility OK yes   ·  Loose Teeth no   ·  Oropharyngeal Classification Class II   ·  ASA PS Classification ASA III   ·  Sedation Plan moderate sedation       ERAS (Enhanced Recovery After Surgery):  ·  ERAS Patient: no     Consent:   COVID-19 Consent:  ·  COVID-19 Risk Consent Surgeon has reviewed key risks related to the risk of jim COVID-19 and if they contract COVID-19 what the risks are.     Assessment/Plan:   ·  Assessment and Plan    62 Year old male with CP and abnormal stress test presents for Left heart Cath with Dr Rasheed.       Electronic Signatures:  Celine Carrero (PAC)  (Signed 26-May-2023 11:50)   Authored: History & Physical Reviewed, Airway/Sedation,  ERAS, Consent, Assessment/Plan, Note Completion      Last Updated: 26-May-2023 11:50 by Celine Carrero (PAC)

## 2023-10-03 ENCOUNTER — PATIENT OUTREACH (OUTPATIENT)
Dept: CARE COORDINATION | Facility: CLINIC | Age: 63
End: 2023-10-03
Payer: COMMERCIAL

## 2023-10-03 NOTE — PROGRESS NOTES
90 day call back.  M for pt to assess needs from recent hospitalization.   Contact info provided.

## 2023-10-19 ENCOUNTER — DOCUMENTATION (OUTPATIENT)
Dept: CARDIOLOGY | Facility: HOSPITAL | Age: 63
End: 2023-10-19
Payer: COMMERCIAL

## 2023-10-19 NOTE — PROGRESS NOTES
The patient is scheduled to have dental work.  There are no cardiac contraindications and the patient may undergo dental work.  He does not require antibiotic prophylaxis from a cardiac perspective.

## 2023-11-16 DIAGNOSIS — E78.2 HYPERLIPIDEMIA, MIXED: ICD-10-CM

## 2023-11-17 RX ORDER — ATORVASTATIN CALCIUM 80 MG/1
80 TABLET, FILM COATED ORAL DAILY
Qty: 90 TABLET | Refills: 0 | Status: SHIPPED | OUTPATIENT
Start: 2023-11-17 | End: 2024-02-23 | Stop reason: SDUPTHER

## 2024-02-21 ENCOUNTER — OFFICE VISIT (OUTPATIENT)
Dept: CARDIOLOGY | Facility: HOSPITAL | Age: 64
End: 2024-02-21
Payer: COMMERCIAL

## 2024-02-21 VITALS
SYSTOLIC BLOOD PRESSURE: 113 MMHG | HEIGHT: 69 IN | BODY MASS INDEX: 30.66 KG/M2 | DIASTOLIC BLOOD PRESSURE: 71 MMHG | WEIGHT: 207 LBS | HEART RATE: 53 BPM

## 2024-02-21 DIAGNOSIS — I25.10 ATHEROSCLEROSIS OF NATIVE CORONARY ARTERY OF NATIVE HEART WITHOUT ANGINA PECTORIS: Primary | ICD-10-CM

## 2024-02-21 DIAGNOSIS — Z95.1 S/P CABG (CORONARY ARTERY BYPASS GRAFT): ICD-10-CM

## 2024-02-21 DIAGNOSIS — G47.9 SLEEP DISTURBANCE: ICD-10-CM

## 2024-02-21 DIAGNOSIS — E78.2 HYPERLIPIDEMIA, MIXED: ICD-10-CM

## 2024-02-21 PROCEDURE — 99214 OFFICE O/P EST MOD 30 MIN: CPT | Performed by: INTERNAL MEDICINE

## 2024-02-21 RX ORDER — BISMUTH SUBSALICYLATE 262 MG
1 TABLET,CHEWABLE ORAL DAILY
COMMUNITY

## 2024-02-21 RX ORDER — LANOLIN ALCOHOL/MO/W.PET/CERES
1000 CREAM (GRAM) TOPICAL DAILY
COMMUNITY

## 2024-02-21 ASSESSMENT — ENCOUNTER SYMPTOMS
OCCASIONAL FEELINGS OF UNSTEADINESS: 0
LOSS OF SENSATION IN FEET: 0
DEPRESSION: 0

## 2024-02-21 NOTE — PATIENT INSTRUCTIONS
Stop metoprolol.  Sleep medicine referral.  Follow-up with your primary physician.  Increase your physical activity and continue to work on losing weight.  Follow-up in 6 months.

## 2024-02-21 NOTE — PROGRESS NOTES
Primary Care Physician: Sarah Kolb MD  Date of Visit: 02/21/2024  1:00 PM EST  Location of visit: [facility]     Chief Complaint:   Chief Complaint   Patient presents with    Coronary Artery Disease    Follow-up    Fatigue        HPI / Summary:   Maynor Barth is a 63 y.o. male presents for followup.  He has no cardiac complaints.  He did complete cardiac rehab without any problems.  He does complain of generalized fatigue and difficulty sleeping at night.  The patient denies chest pain, shortness of breath, palpitations, lightheadedness, syncope, orthopnea, paroxysmal nocturnal dyspnea, lower extremity edema, or bleeding problems.          Past Medical History:   Diagnosis Date    Other specified postprocedural states 12/08/2022    H/O colonoscopy    Personal history of other diseases of the circulatory system     History of coronary artery disease    Personal history of other diseases of the digestive system 08/16/2022    History of umbilical hernia    Pneumonia 03/24/2023    Pure hypercholesterolemia, unspecified 11/26/2018    Pure hypercholesterolemia    Smoker 07/26/2023    Tobacco dependency 03/24/2023        Past Surgical History:   Procedure Laterality Date    OTHER SURGICAL HISTORY  08/12/2022    Circumcision    OTHER SURGICAL HISTORY  08/10/2022    Tonsillectomy          Social History:   reports that he has quit smoking. His smoking use included cigarettes. He has a 37.50 pack-year smoking history. He has never used smokeless tobacco. He reports that he does not currently use alcohol. He reports that he does not use drugs.     Family History:  family history includes CABG in his brother; Cardiac disorder in his brother and father; Elevated liver enzymes in an other family member; Heart attack in his father; Heart failure in his father; Hypertension in his brother; Leukemia in his father; Lung cancer in his father's brother.      Allergies:  No Known Allergies    Outpatient  "Medications:  Current Outpatient Medications   Medication Instructions    acetaminophen (TYLENOL EXTRA STRENGTH) 500 mg, oral, 2 times daily, Taking as needed for headache    aspirin 81 mg, oral, Every morning    atorvastatin (LIPITOR) 80 mg, oral, Daily    coenzyme Q10 (Co Q-10) 400 mg capsule 1 capsule DAILY (route: oral)    cyanocobalamin (VITAMIN B-12) 1,000 mcg, oral, Daily    metoprolol tartrate (LOPRESSOR) 25 mg, oral, 2 times daily    multivitamin tablet 1 tablet, oral, Daily       Physical Exam:  Vitals:    02/21/24 1318   BP: 113/71   BP Location: Left arm   Patient Position: Sitting   BP Cuff Size: Adult   Pulse: 53   Weight: 93.9 kg (207 lb)   Height: 1.74 m (5' 8.5\")     Wt Readings from Last 5 Encounters:   02/21/24 93.9 kg (207 lb)   07/26/23 88.9 kg (196 lb)   06/05/23 94.3 kg (208 lb)   04/26/23 90.7 kg (200 lb 0.3 oz)   03/29/23 94.8 kg (209 lb)     Body mass index is 31.02 kg/m².   General: Well-developed well-nourished in no acute distress  HEENT: Normocephalic atraumatic  Neck: Supple, JVP is normal negative hepatojugular reflux 2+ carotid pulses without bruit  Pulmonary: Normal respiratory effort, clear to auscultation  Cardiovascular: No right ventricular heave, normal S1 and S2, no murmurs rubs or gallops  Abdomen: Soft nontender nondistended  Extremities: Warm without edema 2+ right radial pulse 2+ posterior tibial pulses bilaterally  Neurologic: Alert and oriented x3  Psychiatric: Normal mood and affect     Last Labs:  CMP:  Recent Labs     09/19/23  1156 07/11/23  2301 07/09/23  0452    136 135*   K 4.3 3.7 3.7    99 98   CO2 27 29 30   ANIONGAP 12 12 11   BUN 15 24* 20   CREATININE 0.91 0.92 0.88   GLUCOSE 101* 98 92     Recent Labs     09/19/23  1156 07/11/23  2301 07/09/23  0452 07/05/23  1336 06/15/23  1336 06/15/23  1333   ALBUMIN 4.5 3.3* 3.0*   < > 4.3 CANCELED   ALKPHOS 74  --   --   --  80 CANCELED   ALT 23  --   --   --  23 CANCELED   AST 20  --   --   --  19 CANCELED " "  BILITOT 0.4  --   --   --  0.6 CANCELED    < > = values in this interval not displayed.     CBC:  Recent Labs     09/19/23  1156 07/26/23  1358 07/12/23 2009   WBC 5.0 4.1* 8.8   HGB 12.6* 10.4* 8.5*   HCT 40.0* 33.9* 26.2*    471* 229   MCV 88 93 91     COAG:   Recent Labs     07/11/23  2301 06/15/23  1336   INR 1.2* 1.0     HEME/ENDO:  Recent Labs     03/29/23  1627 08/10/22  1412   TSH  --  2.16   HGBA1C 5.9*  --       CARDIAC: No results for input(s): \"LDH\", \"CKMB\", \"TROPHS\", \"BNP\" in the last 34019 hours.    No lab exists for component: \"CK\", \"CKMBP\"  Recent Labs     08/02/23  0938 03/29/23  1627 08/10/22  1412   CHOL 73 124 233*   LDLF 26 68 146*   HDL 28.5* 31.2* 33.5*   TRIG 93 122 269*       Last Cardiology Tests:  ECG:  An electrocardiogram performed August 2, 2023 that I reviewed shows sinus bradycardia nonspecific T wave abnormality.    Echo:  Echocardiogram 5/26/2023  CONCLUSIONS:  1. Left ventricular systolic function is normal with a 60-65% estimated ejection fraction.  2. RVSP within normal limits.       Cath:  Cardiac catheterization May 26, 2023  Coronary Lesion Summary:  Vessel       Stenosis    Vessel Segment  LAD        30% stenosis     proximal  LAD        90% stenosis       mid  LAD        90% stenosis       mid  Circumflex 85% stenosis     proximal  Circumflex 100% stenosis      mid  RCA        50% stenosis  mid to distal  RPDA       100% stenosis     ostial   CONCLUSIONS:  1. Severe multivessel CAD in a right dominant system.  2. Elevated LVEDP.  3. No evidence of aortic stenosis.    Stress Test:  Stress Results:  Nuclear Stress Test 05/18/2023    Narrative  Interpreted By:  VIOLETA ROBINS MD  MRN: [id number]  Patient Name: SAMY MORALES    STUDY:  CARDIAC STRESS/REST INJECTION; PART 2 STRESS OR REST (NO CHARGE);  CARDIAC STRESS/REST (MYOCARDIAL PERFUSION/MIBI);  5/18/2023 3:45 pm;  5/18/2023 3:47 pm; 5/18/2023 3:46 pm    INDICATION:  CAD  I25.10: Coronary artery " disease.    COMPARISON:  None.    ACCESSION NUMBER(S):  [id number]; [id number]; [id number]    ORDERING CLINICIAN:  ERMIAS BLACK    TECHNIQUE:  DIVISION OF NUCLEAR MEDICINE  STRESS MYOCARDIAL PERFUSION SCAN, ONE DAY PROTOCOL    The patient received an intravenous dose of  11.1 mCi of Tc-99m  Myoview and resting emission tomographic (SPECT) images of the  myocardium were acquired. The patient then exercised via treadmill  stress to  88 % of MPHR and achieved  10.6 METS. At peak stress  30  mCi of Tc-99m  Myoview were administered and stress phase SPECT  images of the myocardium were then acquired. These included ECG-gated  images to assess and quantify ventricular function.  A low-dose,  nondiagnostic regional CT was utilized for attenuation correction  purposes.    FINDINGS:  Both stress and rest studies demonstrate moderate to severe  reversible defect along the mid to distal inferior wall.    The left ventricle is normal in size.    Gated images demonstrate a LV EF estimated at 65%.    Attenuation correction CT images are well below diagnostic quality.    Impression  1.  Suspicion of moderate to severe ski media along the mid to distal  inferior wall.  2. Normal left ventricular size.  3. Left ventricular ejection fraction estimated at 65%.    I personally reviewed the images/study and I agree with the findings  as stated. This study was interpreted at [facility], [place], [place].         Cardiac Imaging:  Carotid ultrasound Ania 15, 2023  CONCLUSIONS:  Right Carotid: Findings are consistent with less than 50% stenosis of the right proximal internal carotid artery. Right external carotid artery appears patent with no evidence of stenosis. The right vertebral artery is patent with antegrade flow. No evidence of hemodynamically significant stenosis in the right subclavian artery.  Left Carotid: Findings are consistent with less than 50% stenosis of the left proximal internal carotid  artery. Left external carotid artery appears patent with no evidence of stenosis. The left vertebral artery is patent with antegrade flow. No evidence of hemodynamically significant stenosis in the left subclavian artery.     Abdominal aortic ultrasound screening May 16, 2023  CONCLUSIONS:  Aorta/Common Iliac Arteries/IVC: No sonographic evidence of abdominal aortic aneurysm noted.      Assessment/Plan   Diagnoses and all orders for this visit:  Atherosclerosis of native coronary artery of native heart without angina pectoris  Hyperlipidemia, mixed  S/P CABG (coronary artery bypass graft)  Sleep disturbance  -     Referral to Adult Sleep Medicine; Future    In summary Mr. Barth is a pleasant 63 year-old white male with a past medical history significant for coronary artery disease with a CT calcium score of 947 in 2017 s/p 3V CABG with preserved LV function, hyperlipidemia, glucose intolerance, obesity, prior tobacco use, and fatty liver.  He is asymptomatic from a cardiac perspective.  He does note generalized fatigue and difficulty sleeping.  I referred him to sleep medicine.  I encouraged him to increase his physical activity and work on weight.  I did discontinue his metoprolol as well.  He should continue his other cardiovascular medications.  We will see him back in follow-up in 6 months.      Orders:  No orders of the defined types were placed in this encounter.     Followup Appts:  Future Appointments   Date Time Provider Department Center   4/24/2024  2:00 PM Riky Rasheed MD [id number]           ____________________________________________________________  Riky Rasheed MD  [facility]  [place] [facility]

## 2024-02-22 DIAGNOSIS — E78.2 HYPERLIPIDEMIA, MIXED: Primary | ICD-10-CM

## 2024-02-22 NOTE — TELEPHONE ENCOUNTER
Patient was here for office visit yesterday and didn't realize he is out of atorvastatin. Requesting a refill to be sent to the [facility] in [place].    Thank you.

## 2024-02-26 RX ORDER — ATORVASTATIN CALCIUM 80 MG/1
80 TABLET, FILM COATED ORAL DAILY
Qty: 90 TABLET | Refills: 3 | Status: SHIPPED | OUTPATIENT
Start: 2024-02-26 | End: 2025-02-25

## 2024-05-13 PROBLEM — D64.9 ANEMIA FOLLOWING SURGERY: Status: ACTIVE | Noted: 2024-05-13

## 2024-09-24 ENCOUNTER — OFFICE VISIT (OUTPATIENT)
Dept: CARDIOLOGY | Facility: HOSPITAL | Age: 64
End: 2024-09-24
Payer: COMMERCIAL

## 2024-09-24 VITALS
HEIGHT: 69 IN | HEART RATE: 56 BPM | SYSTOLIC BLOOD PRESSURE: 130 MMHG | BODY MASS INDEX: 31.77 KG/M2 | OXYGEN SATURATION: 95 % | DIASTOLIC BLOOD PRESSURE: 81 MMHG | WEIGHT: 214.5 LBS

## 2024-09-24 DIAGNOSIS — E66.9 OBESITY (BMI 30-39.9): ICD-10-CM

## 2024-09-24 DIAGNOSIS — Z95.1 S/P CABG (CORONARY ARTERY BYPASS GRAFT): ICD-10-CM

## 2024-09-24 DIAGNOSIS — I25.10 ATHEROSCLEROSIS OF NATIVE CORONARY ARTERY OF NATIVE HEART WITHOUT ANGINA PECTORIS: Primary | ICD-10-CM

## 2024-09-24 DIAGNOSIS — I10 PRIMARY HYPERTENSION: ICD-10-CM

## 2024-09-24 DIAGNOSIS — E78.2 HYPERLIPIDEMIA, MIXED: ICD-10-CM

## 2024-09-24 PROCEDURE — 3075F SYST BP GE 130 - 139MM HG: CPT | Performed by: INTERNAL MEDICINE

## 2024-09-24 PROCEDURE — 99214 OFFICE O/P EST MOD 30 MIN: CPT | Performed by: INTERNAL MEDICINE

## 2024-09-24 PROCEDURE — 3079F DIAST BP 80-89 MM HG: CPT | Performed by: INTERNAL MEDICINE

## 2024-09-24 PROCEDURE — 3008F BODY MASS INDEX DOCD: CPT | Performed by: INTERNAL MEDICINE

## 2024-09-24 PROCEDURE — 93005 ELECTROCARDIOGRAM TRACING: CPT | Performed by: INTERNAL MEDICINE

## 2024-09-24 RX ORDER — AMLODIPINE BESYLATE 5 MG/1
5 TABLET ORAL DAILY
Qty: 90 TABLET | Refills: 3 | Status: SHIPPED | OUTPATIENT
Start: 2024-09-24 | End: 2025-09-24

## 2024-09-24 RX ORDER — ACETAMINOPHEN, DIPHENHYDRAMINE HCL, PHENYLEPHRINE HCL 325; 25; 5 MG/1; MG/1; MG/1
1 TABLET ORAL DAILY
COMMUNITY

## 2024-09-24 NOTE — PATIENT INSTRUCTIONS
Start amlodipine 5 mg daily.  Check fasting blood work.  Clinical pharmacy referral.  Follow-up in 6 months.

## 2024-09-24 NOTE — PROGRESS NOTES
Last seen by PCP: 3/20/17        Refill done according to [facility] policy.           Primary Care Physician: Sarah Kolb MD  Date of Visit: 09/24/2024  3:40 PM EDT  Location of visit: [facility]     Chief Complaint:   No chief complaint on file.       HPI / Summary:   Maynor Barth is a 63 y.o. male presents for followup.  He has no cardiac complaints.  He walks for 1 hour on a daily basis without chest pain or shortness of breath.  The patient denies chest pain, shortness of breath, palpitations, lightheadedness, syncope, orthopnea, paroxysmal nocturnal dyspnea, lower extremity edema, or bleeding problems.    He monitors his blood pressure at home regularly.  His systolic blood pressures usually in 120s to 130s.  His diastolic readings are in the 80s to 90s.          Past Medical History:   Diagnosis Date    Other specified postprocedural states 12/08/2022    H/O colonoscopy    Personal history of other diseases of the circulatory system     History of coronary artery disease    Personal history of other diseases of the digestive system 08/16/2022    History of umbilical hernia    Pneumonia 03/24/2023    Pure hypercholesterolemia, unspecified 11/26/2018    Pure hypercholesterolemia    Smoker 07/26/2023    Tobacco dependency 03/24/2023        Past Surgical History:   Procedure Laterality Date    OTHER SURGICAL HISTORY  08/12/2022    Circumcision    OTHER SURGICAL HISTORY  08/10/2022    Tonsillectomy          Social History:   reports that he has quit smoking. His smoking use included cigarettes. He has a 37.5 pack-year smoking history. He has never used smokeless tobacco. He reports that he does not currently use alcohol. He reports that he does not use drugs.     Family History:  family history includes CABG in his brother; Cardiac disorder in his brother and father; Elevated liver enzymes in an other family member; Heart attack in his father; Heart failure in his father; Hypertension in his brother; Leukemia in his father; Lung cancer in his father's brother.     "  Allergies:  No Known Allergies    Outpatient Medications:  Current Outpatient Medications   Medication Instructions    acetaminophen (TYLENOL EXTRA STRENGTH) 500 mg, oral, 2 times daily, Taking as needed for headache    aspirin 81 mg, oral, Every morning    atorvastatin (LIPITOR) 80 mg, oral, Daily    coenzyme Q10 (Co Q-10) 400 mg capsule 1 capsule DAILY (route: oral)    cyanocobalamin (VITAMIN B-12) 1,000 mcg, oral, Daily    melatonin 10 mg tablet 1 tablet, oral, Daily    multivitamin tablet 1 tablet, oral, Daily       Physical Exam:  Vitals:    09/24/24 1547   BP: 130/81   BP Location: Left arm   Patient Position: Sitting   BP Cuff Size: Adult   Pulse: 56   SpO2: 95%   Weight: 97.3 kg (214 lb 8 oz)   Height: 1.753 m (5' 9\")     Wt Readings from Last 5 Encounters:   09/24/24 97.3 kg (214 lb 8 oz)   02/21/24 93.9 kg (207 lb)   07/26/23 88.9 kg (196 lb)   06/05/23 94.3 kg (208 lb)   04/26/23 90.7 kg (200 lb 0.3 oz)     Body mass index is 31.68 kg/m².   General: Well-developed well-nourished in no acute distress  HEENT: Normocephalic atraumatic  Neck: Supple, JVP is normal negative hepatojugular reflux 2+ carotid pulses without bruit  Pulmonary: Normal respiratory effort, clear to auscultation  Cardiovascular: No right ventricular heave, normal S1 and S2, no murmurs rubs or gallops  Abdomen: Soft nontender nondistended  Extremities: Warm without edema 2+ right radial pulse 2+ posterior tibial pulses bilaterally  Neurologic: Alert and oriented x3  Psychiatric: Normal mood and affect     Last Labs:  CMP:  Recent Labs     09/19/23  1156 07/11/23  2301 07/09/23  0452    136 135*   K 4.3 3.7 3.7    99 98   CO2 27 29 30   ANIONGAP 12 12 11   BUN 15 24* 20   CREATININE 0.91 0.92 0.88   GLUCOSE 101* 98 92     Recent Labs     09/19/23  1156 07/11/23  2301 07/09/23  0452 07/05/23  1336 06/15/23  1336 06/15/23  1333   ALBUMIN 4.5 3.3* 3.0*   < > 4.3 CANCELED   ALKPHOS 74  --   --   --  80 CANCELED   ALT 23  --   -- " "  --  23 CANCELED   AST 20  --   --   --  19 CANCELED   BILITOT 0.4  --   --   --  0.6 CANCELED    < > = values in this interval not displayed.     CBC:  Recent Labs     09/19/23  1156 07/26/23  1358 07/12/23 2009   WBC 5.0 4.1* 8.8   HGB 12.6* 10.4* 8.5*   HCT 40.0* 33.9* 26.2*    471* 229   MCV 88 93 91     COAG:   Recent Labs     07/11/23  2301 06/15/23  1336   INR 1.2* 1.0     HEME/ENDO:  Recent Labs     03/29/23  1627 08/10/22  1412   TSH  --  2.16   HGBA1C 5.9*  --       CARDIAC: No results for input(s): \"LDH\", \"CKMB\", \"TROPHS\", \"BNP\" in the last 20829 hours.    No lab exists for component: \"CK\", \"CKMBP\"  Recent Labs     08/02/23  0938 03/29/23  1627 08/10/22  1412   CHOL 73 124 233*   LDLF 26 68 146*   HDL 28.5* 31.2* 33.5*   TRIG 93 122 269*       Last Cardiology Tests:  ECG:  An electrocardiogram performed today that I reviewed shows sinus bradycardia and is otherwise normal.        Echo:  Echocardiogram 5/26/2023  CONCLUSIONS:  1. Left ventricular systolic function is normal with a 60-65% estimated ejection fraction.  2. RVSP within normal limits.       Cath:  Cardiac catheterization May 26, 2023  Coronary Lesion Summary:  Vessel       Stenosis    Vessel Segment  LAD        30% stenosis     proximal  LAD        90% stenosis       mid  LAD        90% stenosis       mid  Circumflex 85% stenosis     proximal  Circumflex 100% stenosis      mid  RCA        50% stenosis  mid to distal  RPDA       100% stenosis     ostial   CONCLUSIONS:  1. Severe multivessel CAD in a right dominant system.  2. Elevated LVEDP.  3. No evidence of aortic stenosis.    Stress Test:  Stress Results:  Nuclear Stress Test 05/18/2023    Narrative  Interpreted By:  VIOLETA ROBINS MD  MRN: [id number]  Patient Name: SAMY MORALES    STUDY:  CARDIAC STRESS/REST INJECTION; PART 2 STRESS OR REST (NO CHARGE);  CARDIAC STRESS/REST (MYOCARDIAL PERFUSION/MIBI);  5/18/2023 3:45 pm;  5/18/2023 3:47 pm; 5/18/2023 3:46 " pm    INDICATION:  CAD  I25.10: Coronary artery disease.    COMPARISON:  None.    ACCESSION NUMBER(S):  [id number]; [id number]; [id number]    ORDERING CLINICIAN:  ERMIAS BLACK    TECHNIQUE:  DIVISION OF NUCLEAR MEDICINE  STRESS MYOCARDIAL PERFUSION SCAN, ONE DAY PROTOCOL    The patient received an intravenous dose of  11.1 mCi of Tc-99m  Myoview and resting emission tomographic (SPECT) images of the  myocardium were acquired. The patient then exercised via treadmill  stress to  88 % of MPHR and achieved  10.6 METS. At peak stress  30  mCi of Tc-99m  Myoview were administered and stress phase SPECT  images of the myocardium were then acquired. These included ECG-gated  images to assess and quantify ventricular function.  A low-dose,  nondiagnostic regional CT was utilized for attenuation correction  purposes.    FINDINGS:  Both stress and rest studies demonstrate moderate to severe  reversible defect along the mid to distal inferior wall.    The left ventricle is normal in size.    Gated images demonstrate a LV EF estimated at 65%.    Attenuation correction CT images are well below diagnostic quality.    Impression  1.  Suspicion of moderate to severe ski media along the mid to distal  inferior wall.  2. Normal left ventricular size.  3. Left ventricular ejection fraction estimated at 65%.    I personally reviewed the images/study and I agree with the findings  as stated. This study was interpreted at [place], [place].         Cardiac Imaging:  Carotid ultrasound Ania 15, 2023  CONCLUSIONS:  Right Carotid: Findings are consistent with less than 50% stenosis of the right proximal internal carotid artery. Right external carotid artery appears patent with no evidence of stenosis. The right vertebral artery is patent with antegrade flow. No evidence of hemodynamically significant stenosis in the right subclavian artery.  Left Carotid: Findings are consistent with less than 50%  stenosis of the left proximal internal carotid artery. Left external carotid artery appears patent with no evidence of stenosis. The left vertebral artery is patent with antegrade flow. No evidence of hemodynamically significant stenosis in the left subclavian artery.     Abdominal aortic ultrasound screening May 16, 2023  CONCLUSIONS:  Aorta/Common Iliac Arteries/IVC: No sonographic evidence of abdominal aortic aneurysm noted.      Assessment/Plan   Diagnoses and all orders for this visit:  Atherosclerosis of native coronary artery of native heart without angina pectoris  -     ECG 12 lead (Clinic Performed)  -     Comprehensive Metabolic Panel; Future  -     CBC; Future  Hyperlipidemia, mixed  -     Lipid Panel; Future  -     Hemoglobin A1C; Future  S/P CABG (coronary artery bypass graft)  Obesity (BMI 30-39.9)  -     Hemoglobin A1C; Future  -     Referral to Clinical Pharmacy; Future  Primary hypertension  -     amLODIPine (Norvasc) 5 mg tablet; Take 1 tablet (5 mg) by mouth once daily.    In summary Mr. Barth is a pleasant 63 year-old white male with a past medical history significant for coronary artery disease with a CT calcium score of 947 in 2017 s/p 3V CABG with preserved LV function, hyperlipidemia, hypertension, glucose intolerance, obesity, prior tobacco use, and fatty liver.  He is asymptomatic from a cardiac perspective.  His blood pressure has been elevated on multiple occasions at home.  I started amlodipine 5 mg daily.  I ordered labs as indicated below.  I placed a clinical pharmacy referral to assist with a GLP-1 agonist.  I encouraged him to continue his efforts at losing weight.  We will see him back in follow-up in 6 months.      Orders:  Orders Placed This Encounter   Procedures    Lipid Panel    Comprehensive Metabolic Panel    CBC    Hemoglobin A1C    Referral to Clinical Pharmacy    ECG 12 lead (Clinic Performed)      Followup Appts:  Future Appointments   Date Time Provider  Department Center   3/18/2025  3:30 PM Vilma Pastrana APRN-CNP [id number]   9/30/2025  2:00 PM Riky Rasheed MD [id number]             ____________________________________________________________  Riky Rasheed MD  [facility]

## 2024-09-25 LAB
ATRIAL RATE: 56 BPM
P AXIS: 9 DEGREES
P OFFSET: 191 MS
P ONSET: 138 MS
PR INTERVAL: 158 MS
Q ONSET: 217 MS
QRS COUNT: 9 BEATS
QRS DURATION: 88 MS
QT INTERVAL: 426 MS
QTC CALCULATION(BAZETT): 411 MS
QTC FREDERICIA: 416 MS
R AXIS: 57 DEGREES
T AXIS: 55 DEGREES
T OFFSET: 430 MS
VENTRICULAR RATE: 56 BPM

## 2024-10-08 DIAGNOSIS — E66.9 OBESITY (BMI 30-39.9): Primary | ICD-10-CM

## 2024-11-07 ENCOUNTER — APPOINTMENT (OUTPATIENT)
Dept: PHARMACY | Facility: HOSPITAL | Age: 64
End: 2024-11-07
Payer: COMMERCIAL

## 2024-11-14 ENCOUNTER — LAB (OUTPATIENT)
Dept: LAB | Facility: LAB | Age: 64
End: 2024-11-14
Payer: COMMERCIAL

## 2024-11-14 DIAGNOSIS — I25.10 ATHEROSCLEROSIS OF NATIVE CORONARY ARTERY OF NATIVE HEART WITHOUT ANGINA PECTORIS: ICD-10-CM

## 2024-11-14 DIAGNOSIS — E78.2 HYPERLIPIDEMIA, MIXED: ICD-10-CM

## 2024-11-14 DIAGNOSIS — E66.9 OBESITY (BMI 30-39.9): ICD-10-CM

## 2024-11-14 LAB
ALBUMIN SERPL BCP-MCNC: 4.8 G/DL (ref 3.4–5)
ALP SERPL-CCNC: 100 U/L (ref 33–136)
ALT SERPL W P-5'-P-CCNC: 38 U/L (ref 10–52)
ANION GAP SERPL CALC-SCNC: 15 MMOL/L (ref 10–20)
AST SERPL W P-5'-P-CCNC: 29 U/L (ref 9–39)
BILIRUB SERPL-MCNC: 0.8 MG/DL (ref 0–1.2)
BUN SERPL-MCNC: 17 MG/DL (ref 6–23)
CALCIUM SERPL-MCNC: 9.7 MG/DL (ref 8.6–10.6)
CHLORIDE SERPL-SCNC: 99 MMOL/L (ref 98–107)
CHOLEST SERPL-MCNC: 117 MG/DL (ref 0–199)
CHOLESTEROL/HDL RATIO: 4
CO2 SERPL-SCNC: 29 MMOL/L (ref 21–32)
CREAT SERPL-MCNC: 1.04 MG/DL (ref 0.5–1.3)
EGFRCR SERPLBLD CKD-EPI 2021: 80 ML/MIN/1.73M*2
ERYTHROCYTE [DISTWIDTH] IN BLOOD BY AUTOMATED COUNT: 13 % (ref 11.5–14.5)
EST. AVERAGE GLUCOSE BLD GHB EST-MCNC: 123 MG/DL
GLUCOSE SERPL-MCNC: 105 MG/DL (ref 74–99)
HBA1C MFR BLD: 5.9 %
HCT VFR BLD AUTO: 42.7 % (ref 41–52)
HDLC SERPL-MCNC: 29.6 MG/DL
HGB BLD-MCNC: 14.2 G/DL (ref 13.5–17.5)
LDLC SERPL CALC-MCNC: 64 MG/DL
MCH RBC QN AUTO: 31.3 PG (ref 26–34)
MCHC RBC AUTO-ENTMCNC: 33.3 G/DL (ref 32–36)
MCV RBC AUTO: 94 FL (ref 80–100)
NON HDL CHOLESTEROL: 87 MG/DL (ref 0–149)
NRBC BLD-RTO: 0 /100 WBCS (ref 0–0)
PLATELET # BLD AUTO: 214 X10*3/UL (ref 150–450)
POTASSIUM SERPL-SCNC: 4.3 MMOL/L (ref 3.5–5.3)
PROT SERPL-MCNC: 7.5 G/DL (ref 6.4–8.2)
RBC # BLD AUTO: 4.54 X10*6/UL (ref 4.5–5.9)
SODIUM SERPL-SCNC: 139 MMOL/L (ref 136–145)
TRIGL SERPL-MCNC: 119 MG/DL (ref 0–149)
VLDL: 24 MG/DL (ref 0–40)
WBC # BLD AUTO: 3.7 X10*3/UL (ref 4.4–11.3)

## 2024-11-14 PROCEDURE — 80061 LIPID PANEL: CPT

## 2024-11-14 PROCEDURE — 85027 COMPLETE CBC AUTOMATED: CPT

## 2024-11-14 PROCEDURE — 83036 HEMOGLOBIN GLYCOSYLATED A1C: CPT

## 2024-11-14 PROCEDURE — 80053 COMPREHEN METABOLIC PANEL: CPT

## 2024-11-14 PROCEDURE — 36415 COLL VENOUS BLD VENIPUNCTURE: CPT

## 2024-11-19 NOTE — PROGRESS NOTES
Pharmacist Clinic: Cardiology Management    Maynor Barth is a 64 y.o. male was referred to Clinical Pharmacy Team for  management.     Referring Provider: Riky Rasheed MD    THIS IS A NEW PATIENT APPOINTMENT. PATIENT WILL BE ESTABLISHING CARE WITH CLINICAL PHARMACY.    Appointment was completed by maynor who was reached at [phone].        Allergies Reviewed? Yes nkda    No Known Allergies    Past Medical History:   Diagnosis Date    Other specified postprocedural states 12/08/2022    H/O colonoscopy    Personal history of other diseases of the circulatory system     History of coronary artery disease    Personal history of other diseases of the digestive system 08/16/2022    History of umbilical hernia    Pneumonia 03/24/2023    Pure hypercholesterolemia, unspecified 11/26/2018    Pure hypercholesterolemia    Smoker 07/26/2023    Tobacco dependency 03/24/2023       Current Outpatient Medications on File Prior to Visit   Medication Sig Dispense Refill    acetaminophen (Tylenol Extra Strength) 500 mg tablet Take 1 tablet (500 mg) by mouth 2 times a day. Taking as needed for headache      amLODIPine (Norvasc) 5 mg tablet Take 1 tablet (5 mg) by mouth once daily. 90 tablet 3    aspirin 81 mg EC tablet Take 1 tablet (81 mg) by mouth once daily in the morning.      atorvastatin (Lipitor) 80 mg tablet Take 1 tablet (80 mg) by mouth once daily. 90 tablet 3    coenzyme Q10 (Co Q-10) 400 mg capsule 1 capsule DAILY (route: oral)      cyanocobalamin (Vitamin B-12) 1,000 mcg tablet Take 1 tablet (1,000 mcg) by mouth once daily.      melatonin 10 mg tablet Take 1 tablet (10 mg) by mouth once daily.      multivitamin tablet Take 1 tablet by mouth once daily.       No current facility-administered medications on file prior to visit.         RELEVANT LAB RESULTS:  Lab Results   Component Value Date    BILITOT 0.8 11/14/2024    CALCIUM 9.7 11/14/2024    CO2 29 11/14/2024    CL 99 11/14/2024    CREATININE 1.04  "11/14/2024    GLUCOSE 105 (H) 11/14/2024    ALKPHOS 100 11/14/2024    K 4.3 11/14/2024    PROT 7.5 11/14/2024     11/14/2024    AST 29 11/14/2024    ALT 38 11/14/2024    BUN 17 11/14/2024    ANIONGAP 15 11/14/2024    MG 2.10 07/11/2023    PHOS 3.5 07/11/2023    ALBUMIN 4.8 11/14/2024    AMYLASE 55 09/19/2023    LIPASE 58 09/19/2023    GFRF CANCELED 06/15/2023    GFRMALE >90 09/19/2023     Lab Results   Component Value Date    TRIG 119 11/14/2024    CHOL 117 11/14/2024    LDLCALC 64 11/14/2024    HDL 29.6 11/14/2024     No results found for: \"BMCBC\", \"CBCDIF\"     PHARMACEUTICAL ASSESSMENT:    MEDICATION RECONCILIATION    Home Pharmacy Reviewed? Yes, describe: [facility]      Drug Interactions? No    Medication Documentation Review Audit       Reviewed by Riky Rasheed MD (Physician) on 09/24/24 at 1614      Medication Order Taking? Sig Documenting Provider Last Dose Status   acetaminophen (Tylenol Extra Strength) 500 mg tablet [id number] Yes Take 1 tablet (500 mg) by mouth 2 times a day. Taking as needed for headache Historical MD Rudy Taking Active   aspirin 81 mg EC tablet [id number] Yes Take 1 tablet (81 mg) by mouth once daily in the morning. Historical Provider, MD Taking Active   atorvastatin (Lipitor) 80 mg tablet [id number] Yes Take 1 tablet (80 mg) by mouth once daily. Riky Rasheed MD Taking Active   coenzyme Q10 (Co Q-10) 400 mg capsule [id number] Yes 1 capsule DAILY (route: oral) Historical Provider, MD Taking Active   cyanocobalamin (Vitamin B-12) 1,000 mcg tablet [id number] Yes Take 1 tablet (1,000 mcg) by mouth once daily. Historical Provider, MD Taking Active   melatonin 10 mg tablet [id number] Yes Take 1 tablet (10 mg) by mouth once daily. Historical Provider, MD Taking Active   multivitamin tablet [id number] Yes Take 1 tablet by mouth once daily. Historical Provider, MD Taking Active                    DISEASE MANAGEMENT ASSESSMENT:       DISCUSSION/NOTES:   Currently no " coverage for glp1s through current mmo plan. Patient just hit medicare age and is going to look into signing up for a medicare plan at which point we will submit pa for coverage for wegovy.     ASSESSMENT:    Assessment/Plan   Problem List Items Addressed This Visit    None        RECOMMENDATIONS/PLAN:    Currently no coverage for glp1s through current mmo plan. Patient just hit medicare age and is going to look into signing up for a medicare plan at which point we will submit pa for coverage for wegovy.     Last Appnt with Referring Provider: 9/24/24  Next Appnt with Referring Provider: 9/30/24  Clinical Pharmacist follow up: 1/10 @1020a  Type of Encounter: Jody No PharmD    Verbal consent to manage patient's drug therapy was obtained from the patient . They were informed they may decline to participate or withdraw from participation in pharmacy services at any time.    Continue all meds under the continuation of care with the referring provider and clinical pharmacy team.

## 2024-11-20 ENCOUNTER — APPOINTMENT (OUTPATIENT)
Dept: PHARMACY | Facility: HOSPITAL | Age: 64
End: 2024-11-20
Payer: COMMERCIAL

## 2024-11-20 DIAGNOSIS — E66.9 OBESITY (BMI 30-39.9): Primary | ICD-10-CM

## 2024-11-20 DIAGNOSIS — E66.9 OBESITY (BMI 30-39.9): ICD-10-CM

## 2024-12-05 DIAGNOSIS — E78.2 HYPERLIPIDEMIA, MIXED: ICD-10-CM

## 2024-12-05 DIAGNOSIS — I10 PRIMARY HYPERTENSION: ICD-10-CM

## 2024-12-05 RX ORDER — AMLODIPINE BESYLATE 10 MG/1
10 TABLET ORAL DAILY
Qty: 90 TABLET | Refills: 3 | Status: SHIPPED | OUTPATIENT
Start: 2024-12-05 | End: 2025-12-05

## 2024-12-12 RX ORDER — EZETIMIBE 10 MG/1
10 TABLET ORAL DAILY
Qty: 90 TABLET | Refills: 3 | Status: SHIPPED | OUTPATIENT
Start: 2024-12-12 | End: 2025-12-12

## 2024-12-26 ENCOUNTER — TELEPHONE (OUTPATIENT)
Dept: PRIMARY CARE | Facility: CLINIC | Age: 64
End: 2024-12-26
Payer: COMMERCIAL

## 2024-12-26 DIAGNOSIS — L20.9 ATOPIC DERMATITIS, UNSPECIFIED TYPE: Primary | ICD-10-CM

## 2024-12-26 RX ORDER — DESOXIMETASONE 2.5 MG/G
CREAM TOPICAL 2 TIMES DAILY
Qty: 45 G | Refills: 2 | Status: SHIPPED | OUTPATIENT
Start: 2024-12-26 | End: 2025-12-26

## 2025-01-10 ENCOUNTER — APPOINTMENT (OUTPATIENT)
Dept: PHARMACY | Facility: HOSPITAL | Age: 65
End: 2025-01-10
Payer: COMMERCIAL

## 2025-01-24 DIAGNOSIS — U07.1 COVID: Primary | ICD-10-CM

## 2025-02-21 DIAGNOSIS — E78.2 HYPERLIPIDEMIA, MIXED: ICD-10-CM

## 2025-02-21 RX ORDER — ATORVASTATIN CALCIUM 80 MG/1
80 TABLET, FILM COATED ORAL DAILY
Qty: 90 TABLET | Refills: 3 | Status: SHIPPED | OUTPATIENT
Start: 2025-02-21 | End: 2026-02-21

## 2025-03-18 ENCOUNTER — OFFICE VISIT (OUTPATIENT)
Dept: CARDIOLOGY | Facility: HOSPITAL | Age: 65
End: 2025-03-18
Payer: COMMERCIAL

## 2025-03-18 VITALS
SYSTOLIC BLOOD PRESSURE: 108 MMHG | OXYGEN SATURATION: 98 % | HEIGHT: 69 IN | HEART RATE: 86 BPM | DIASTOLIC BLOOD PRESSURE: 67 MMHG | BODY MASS INDEX: 32.14 KG/M2 | WEIGHT: 217 LBS

## 2025-03-18 DIAGNOSIS — I25.10 ATHEROSCLEROSIS OF NATIVE CORONARY ARTERY OF NATIVE HEART WITHOUT ANGINA PECTORIS: ICD-10-CM

## 2025-03-18 DIAGNOSIS — E78.2 HYPERLIPIDEMIA, MIXED: ICD-10-CM

## 2025-03-18 DIAGNOSIS — I10 PRIMARY HYPERTENSION: Primary | ICD-10-CM

## 2025-03-18 DIAGNOSIS — Z95.1 S/P CABG (CORONARY ARTERY BYPASS GRAFT): ICD-10-CM

## 2025-03-18 PROCEDURE — 3074F SYST BP LT 130 MM HG: CPT | Performed by: NURSE PRACTITIONER

## 2025-03-18 PROCEDURE — 99214 OFFICE O/P EST MOD 30 MIN: CPT | Performed by: NURSE PRACTITIONER

## 2025-03-18 PROCEDURE — G2211 COMPLEX E/M VISIT ADD ON: HCPCS | Performed by: NURSE PRACTITIONER

## 2025-03-18 PROCEDURE — 1157F ADVNC CARE PLAN IN RCRD: CPT | Performed by: NURSE PRACTITIONER

## 2025-03-18 PROCEDURE — 1159F MED LIST DOCD IN RCRD: CPT | Performed by: NURSE PRACTITIONER

## 2025-03-18 PROCEDURE — 3078F DIAST BP <80 MM HG: CPT | Performed by: NURSE PRACTITIONER

## 2025-03-18 PROCEDURE — 1036F TOBACCO NON-USER: CPT | Performed by: NURSE PRACTITIONER

## 2025-03-18 PROCEDURE — 3008F BODY MASS INDEX DOCD: CPT | Performed by: NURSE PRACTITIONER

## 2025-03-18 RX ORDER — AMLODIPINE BESYLATE 5 MG/1
5 TABLET ORAL DAILY
Qty: 90 TABLET | Refills: 3 | Status: SHIPPED | OUTPATIENT
Start: 2025-03-18 | End: 2026-03-18

## 2025-03-18 RX ORDER — CHLORTHALIDONE 25 MG/1
25 TABLET ORAL DAILY
Qty: 90 TABLET | Refills: 3 | Status: SHIPPED | OUTPATIENT
Start: 2025-03-18 | End: 2026-03-18

## 2025-03-18 NOTE — PATIENT INSTRUCTIONS
Decrease Amlodipine to 5 mg once a day  Start Chlorthalidone 25 mg once a day  Check fasting lipid panel  Continue current meds  Follow up as scheduled with Dr. Rasheed   Continue to monitor blood pressure at home. If you get low blood pressure readings, dizziness/lightheadedness, or continue to have lower extremity edema to call me or message me  Increase physical activity and weight loss

## 2025-03-18 NOTE — PROGRESS NOTES
Primary Care Physician: Sarah Kolb MD  Date of Visit: 03/18/2025  3:30 PM EDT  Location of visit: [facility]     Chief Complaint:   Chief Complaint   Patient presents with    Follow-up        HPI / Summary:   Maynor Barth is a 64 y.o. male presents for followup. Seen in collaboration with Dr. Rasheed. He has noted lower extremity edema extending into his feet over the past month. He is able to do daily activity without chest pain or dyspnea. He has not been exercising over the winter. The patient denies chest pain, shortness of breath, palpitations, lightheadedness, syncope, orthopnea, paroxysmal nocturnal dyspnea, or bleeding problems.     He monitors his blood pressure at home regularly.  His systolic blood pressures usually in 110 to 120.  His diastolic readings are in the 70s to 80s.              Past Medical History:  Past Medical History:   Diagnosis Date    Other specified postprocedural states 12/08/2022    H/O colonoscopy    Personal history of other diseases of the circulatory system     History of coronary artery disease    Personal history of other diseases of the digestive system 08/16/2022    History of umbilical hernia    Pneumonia 03/24/2023    Pure hypercholesterolemia, unspecified 11/26/2018    Pure hypercholesterolemia    Smoker 07/26/2023    Tobacco dependency 03/24/2023        Past Surgical History:  Past Surgical History:   Procedure Laterality Date    OTHER SURGICAL HISTORY  08/12/2022    Circumcision    OTHER SURGICAL HISTORY  08/10/2022    Tonsillectomy          Social History:   reports that he has quit smoking. His smoking use included cigarettes. He has a 37.5 pack-year smoking history. He has never used smokeless tobacco. He reports that he does not currently use alcohol. He reports that he does not use drugs.     Family History:  family history includes CABG in his brother; Cardiac disorder in his brother and father; Elevated liver enzymes in an other family  "member; Heart attack in his father; Heart failure in his father; Hypertension in his brother; Leukemia in his father; Lung cancer in his father's brother.      Allergies:  No Known Allergies    Outpatient Medications:  Current Outpatient Medications   Medication Instructions    acetaminophen (TYLENOL EXTRA STRENGTH) 500 mg, 2 times daily    amLODIPine (NORVASC) 10 mg, oral, Daily    aspirin 81 mg, Every morning    atorvastatin (LIPITOR) 80 mg, oral, Daily    coenzyme Q-10 300 mg capsule capsule     cyanocobalamin (VITAMIN B-12) 1,000 mcg, Daily    desoximetasone (Topicort) 0.25 % cream Topical, 2 times daily    ezetimibe (ZETIA) 10 mg, oral, Daily    melatonin 12 mg, Daily       Physical Exam:  Vitals:    03/18/25 1537   BP: 108/67   BP Location: Right arm   Patient Position: Sitting   BP Cuff Size: Adult   Pulse: 86   SpO2: 98%   Weight: 98.4 kg (217 lb)   Height: 1.753 m (5' 9\")     Wt Readings from Last 5 Encounters:   03/18/25 98.4 kg (217 lb)   09/24/24 97.3 kg (214 lb 8 oz)   02/21/24 93.9 kg (207 lb)   07/26/23 88.9 kg (196 lb)   06/05/23 94.3 kg (208 lb)     Body mass index is 32.05 kg/m².   GENERAL: alert, cooperative, pleasant, in no acute distress  SKIN: warm and dry  NECK: Normal JVD, negative HJR  CARDIAC: Regular rate and rhythm with no rubs, murmurs, or gallops  CHEST: Normal respiratory efforts, lungs clear to auscultation bilaterally.  ABDOMEN: soft, nontender, nondistended  EXTREMITIES: +1 bilateral lower extremity edema, +2 palpable right RP and Left radial graft and DP pulses bilaterally       Last Labs:  Recent Labs     11/14/24  1155 09/19/23  1156 07/26/23  1358   WBC 3.7* 5.0 4.1*   HGB 14.2 12.6* 10.4*   HCT 42.7 40.0* 33.9*    209 471*   MCV 94 88 93     Recent Labs     11/14/24  1155 09/19/23  1156 07/11/23  2301    140 136   K 4.3 4.3 3.7   CL 99 105 99   BUN 17 15 24*   CREATININE 1.04 0.91 0.92     CMP -  Lab Results   Component Value Date    CALCIUM 9.7 11/14/2024    PHOS " 3.5 07/11/2023    PROT 7.5 11/14/2024    ALBUMIN 4.8 11/14/2024    AST 29 11/14/2024    ALT 38 11/14/2024    ALKPHOS 100 11/14/2024    BILITOT 0.8 11/14/2024       LIPID PANEL -   Lab Results   Component Value Date    CHOL 117 11/14/2024    HDL 29.6 11/14/2024    LDLF 26 08/02/2023    TRIG 119 11/14/2024   LDL 64 11/14/24    Lab Results   Component Value Date    HGBA1C 5.9 (H) 11/14/2024       Last Cardiology Tests:  ECG:  Reviewed EKG from 9/24/24- sinus bradycardia HR 56    Echo:  Echocardiogram 5/26/2023  CONCLUSIONS:  1. Left ventricular systolic function is normal with a 60-65% estimated ejection fraction.  2. RVSP within normal limits.        Cath:  Cardiac catheterization May 26, 2023  Coronary Lesion Summary:  Vessel       Stenosis    Vessel Segment  LAD        30% stenosis     proximal  LAD        90% stenosis       mid  LAD        90% stenosis       mid  Circumflex 85% stenosis     proximal  Circumflex 100% stenosis      mid  RCA        50% stenosis  mid to distal  RPDA       100% stenosis     ostial   CONCLUSIONS:  1. Severe multivessel CAD in a right dominant system.  2. Elevated LVEDP.  3. No evidence of aortic stenosis.     Stress Test:  Stress Results:  Nuclear Stress Test 05/18/2023     Narrative  Interpreted By:  VIOLETA ROBINS MD  MRN: [id number]  Patient Name: SAMY MORALES     STUDY:  CARDIAC STRESS/REST INJECTION; PART 2 STRESS OR REST (NO CHARGE);  CARDIAC STRESS/REST (MYOCARDIAL PERFUSION/MIBI);  5/18/2023 3:45 pm;  5/18/2023 3:47 pm; 5/18/2023 3:46 pm     INDICATION:  CAD  I25.10: Coronary artery disease.     COMPARISON:  None.     ACCESSION NUMBER(S):  [id number]; [id number]; [id number]     ORDERING CLINICIAN:  ERMIAS BLACK     TECHNIQUE:  DIVISION OF NUCLEAR MEDICINE  STRESS MYOCARDIAL PERFUSION SCAN, ONE DAY PROTOCOL     The patient received an intravenous dose of  11.1 mCi of Tc-99m  Myoview and resting emission tomographic (SPECT) images of the  myocardium were acquired. The patient then  exercised via treadmill  stress to  88 % of MPHR and achieved  10.6 METS. At peak stress  30  mCi of Tc-99m  Myoview were administered and stress phase SPECT  images of the myocardium were then acquired. These included ECG-gated  images to assess and quantify ventricular function.  A low-dose,  nondiagnostic regional CT was utilized for attenuation correction  purposes.     FINDINGS:  Both stress and rest studies demonstrate moderate to severe  reversible defect along the mid to distal inferior wall.     The left ventricle is normal in size.     Gated images demonstrate a LV EF estimated at 65%.     Attenuation correction CT images are well below diagnostic quality.     Impression  1.  Suspicion of moderate to severe ski media along the mid to distal  inferior wall.  2. Normal left ventricular size.  3. Left ventricular ejection fraction estimated at 65%.     I personally reviewed the images/study and I agree with the findings  as stated. This study was interpreted at [place], [place].           Cardiac Imaging:  Carotid ultrasound Ania 15, 2023  CONCLUSIONS:  Right Carotid: Findings are consistent with less than 50% stenosis of the right proximal internal carotid artery. Right external carotid artery appears patent with no evidence of stenosis. The right vertebral artery is patent with antegrade flow. No evidence of hemodynamically significant stenosis in the right subclavian artery.  Left Carotid: Findings are consistent with less than 50% stenosis of the left proximal internal carotid artery. Left external carotid artery appears patent with no evidence of stenosis. The left vertebral artery is patent with antegrade flow. No evidence of hemodynamically significant stenosis in the left subclavian artery.     Abdominal aortic ultrasound screening May 16, 2023  CONCLUSIONS:  Aorta/Common Iliac Arteries/IVC: No sonographic evidence of abdominal aortic aneurysm noted.        Assessment/Plan   Problem List Items Addressed This Visit          Cardiac and Vasculature    Atherosclerosis of native coronary artery of native heart without angina pectoris    Relevant Medications    amLODIPine (Norvasc) 5 mg tablet    Other Relevant Orders    Lipid panel    Hyperlipidemia, mixed    Relevant Orders    Lipid panel    S/P CABG (coronary artery bypass graft) - Primary    Relevant Orders    Lipid panel     Other Visit Diagnoses       Primary hypertension        Relevant Medications    amLODIPine (Norvasc) 5 mg tablet    chlorthalidone (Hygroton) 25 mg tablet    Other Relevant Orders    Basic metabolic panel          In summary Mr. Barth is a pleasant 64 year-old white male with a past medical history significant for coronary artery disease with a CT calcium score of 947 in 2017 s/p 3V CABG with preserved LV function, hyperlipidemia, hypertension, glucose intolerance, obesity, prior tobacco use, and fatty liver.  He has noted lower extremity edema over the past month that I suspect is secondary to Amlodipine. I instructed him to decrease Amlodipine to 5 mg once a day and start Chlorthalidone 25 mg once a day. I have ordered fasting blood work as above to be done in one week. I have encouraged him to increase physical activity and lose weight. He does not desire to try GLP agonist. I have instructed him to continue to monitor blood pressure at home and call me if he has low blood pressure readings or continues to have lower extremity edema. He will continue all other cardiovascular medications. We will see him back in follow-up in 6 months.         Orders:  No orders of the defined types were placed in this encounter.     Followup Appts:  Future Appointments   Date Time Provider Department Center   9/30/2025  2:00 PM Riky Rasheed MD [id number]           ____________________________________________________________  Vilma Pastrana, APRN-CNP  [facility]  [facility]

## 2025-03-25 DIAGNOSIS — R73.03 PRE-DIABETES: ICD-10-CM

## 2025-03-25 DIAGNOSIS — E78.2 HYPERLIPIDEMIA, MIXED: ICD-10-CM

## 2025-03-25 DIAGNOSIS — E87.6 HYPOKALEMIA: ICD-10-CM

## 2025-03-25 DIAGNOSIS — Z00.00 HEALTHCARE MAINTENANCE: Primary | ICD-10-CM

## 2025-03-25 DIAGNOSIS — I25.10 ATHEROSCLEROSIS OF NATIVE CORONARY ARTERY OF NATIVE HEART WITHOUT ANGINA PECTORIS: ICD-10-CM

## 2025-03-25 DIAGNOSIS — I10 PRIMARY HYPERTENSION: ICD-10-CM

## 2025-03-25 DIAGNOSIS — Z95.1 S/P CABG (CORONARY ARTERY BYPASS GRAFT): ICD-10-CM

## 2025-03-25 LAB
ANION GAP SERPL CALCULATED.4IONS-SCNC: 15 MMOL/L (CALC) (ref 7–17)
BUN SERPL-MCNC: 15 MG/DL (ref 7–25)
BUN/CREAT SERPL: ABNORMAL (CALC) (ref 6–22)
CALCIUM SERPL-MCNC: 10 MG/DL (ref 8.6–10.3)
CHLORIDE SERPL-SCNC: 95 MMOL/L (ref 98–110)
CHOLEST SERPL-MCNC: 91 MG/DL
CHOLEST/HDLC SERPL: 2.8 (CALC)
CO2 SERPL-SCNC: 27 MMOL/L (ref 20–32)
CREAT SERPL-MCNC: 1.02 MG/DL (ref 0.7–1.35)
EGFRCR SERPLBLD CKD-EPI 2021: 82 ML/MIN/1.73M2
GLUCOSE SERPL-MCNC: 124 MG/DL (ref 65–99)
HDLC SERPL-MCNC: 32 MG/DL
LDLC SERPL CALC-MCNC: 39 MG/DL (CALC)
NONHDLC SERPL-MCNC: 59 MG/DL (CALC)
POTASSIUM SERPL-SCNC: 3.2 MMOL/L (ref 3.5–5.3)
SODIUM SERPL-SCNC: 137 MMOL/L (ref 135–146)
TRIGL SERPL-MCNC: 118 MG/DL

## 2025-03-31 DIAGNOSIS — E66.9 OBESITY (BMI 30-39.9): Primary | ICD-10-CM

## 2025-03-31 DIAGNOSIS — I10 PRIMARY HYPERTENSION: ICD-10-CM

## 2025-03-31 RX ORDER — POTASSIUM CHLORIDE 20 MEQ/1
20 TABLET, EXTENDED RELEASE ORAL DAILY
Qty: 90 TABLET | Refills: 3 | Status: SHIPPED | OUTPATIENT
Start: 2025-03-31 | End: 2026-03-31

## 2025-03-31 NOTE — RESULT ENCOUNTER NOTE
Spoke to patient on the phone. Renal function stable. Potassium low. Started Potassium 20 MEQ daily. Repeat potassium level in a week. He will also restart eating Banana daily. Impaired fasting blood sugar- ordered Hemoglobin A1C. Patient request PSA. Instructed patient he should also follow up with primary care physician. Patient agreeable to plan and verbalized understanding.

## 2025-04-08 LAB
CHOLEST SERPL-MCNC: 96 MG/DL
CHOLEST/HDLC SERPL: 3.2 (CALC)
HDLC SERPL-MCNC: 30 MG/DL
LDLC SERPL CALC-MCNC: 41 MG/DL (CALC)
NONHDLC SERPL-MCNC: 66 MG/DL (CALC)
TRIGL SERPL-MCNC: 170 MG/DL

## 2025-04-09 ENCOUNTER — TELEPHONE (OUTPATIENT)
Dept: CARDIOLOGY | Facility: HOSPITAL | Age: 65
End: 2025-04-09
Payer: COMMERCIAL

## 2025-04-09 DIAGNOSIS — I10 PRIMARY HYPERTENSION: Primary | ICD-10-CM

## 2025-04-09 LAB
EST. AVERAGE GLUCOSE BLD GHB EST-MCNC: 154 MG/DL
EST. AVERAGE GLUCOSE BLD GHB EST-SCNC: 8.5 MMOL/L
HBA1C MFR BLD: 7 % OF TOTAL HGB
POTASSIUM SERPL-SCNC: 3.1 MMOL/L (ref 3.5–5.3)
PSA SERPL-MCNC: 2.93 NG/ML

## 2025-04-09 RX ORDER — SPIRONOLACTONE 25 MG/1
25 TABLET ORAL DAILY
Qty: 90 TABLET | Refills: 3 | Status: SHIPPED | OUTPATIENT
Start: 2025-04-09 | End: 2026-04-09

## 2025-04-09 NOTE — TELEPHONE ENCOUNTER
Spoke to patient on phone. Potassium remains low despite taking Potassium 20 MEQ daily. Suspect Chlorthalidone is contributing to hypokalemia. I have instructed him to stop Chlorthalidone and start Spironolactone 25 mg daily. He will take Potassium for 3 days after starting Spironolactone then stop. I suspect he will not need potassium as his potassium previously was not low. He will have repeat blood work in a week- BMP. His [id number] is now in the diabetic range. I have instructed him to modify diet reduce sugar and carbohydrate intake and follow up with primary care physician. His lipid panel is at goal except triglycerides although 2 weeks ago these were normal. Patient is agreeable to above plan.

## 2025-04-17 LAB
ANION GAP SERPL CALCULATED.4IONS-SCNC: 13 MMOL/L (CALC) (ref 7–17)
BUN SERPL-MCNC: 20 MG/DL (ref 7–25)
BUN/CREAT SERPL: ABNORMAL (CALC) (ref 6–22)
CALCIUM SERPL-MCNC: 9.5 MG/DL (ref 8.6–10.3)
CHLORIDE SERPL-SCNC: 101 MMOL/L (ref 98–110)
CO2 SERPL-SCNC: 24 MMOL/L (ref 20–32)
CREAT SERPL-MCNC: 0.88 MG/DL (ref 0.7–1.35)
EGFRCR SERPLBLD CKD-EPI 2021: 95 ML/MIN/1.73M2
GLUCOSE SERPL-MCNC: 105 MG/DL (ref 65–99)
POTASSIUM SERPL-SCNC: 4.3 MMOL/L (ref 3.5–5.3)
SODIUM SERPL-SCNC: 138 MMOL/L (ref 135–146)

## 2025-04-18 ENCOUNTER — OFFICE VISIT (OUTPATIENT)
Dept: PRIMARY CARE | Facility: CLINIC | Age: 65
End: 2025-04-18
Payer: COMMERCIAL

## 2025-04-18 VITALS — SYSTOLIC BLOOD PRESSURE: 110 MMHG | WEIGHT: 211 LBS | DIASTOLIC BLOOD PRESSURE: 78 MMHG | BODY MASS INDEX: 31.16 KG/M2

## 2025-04-18 DIAGNOSIS — I25.10 ATHEROSCLEROSIS OF NATIVE CORONARY ARTERY OF NATIVE HEART WITHOUT ANGINA PECTORIS: ICD-10-CM

## 2025-04-18 DIAGNOSIS — R91.1 PULMONARY NODULE: ICD-10-CM

## 2025-04-18 DIAGNOSIS — E78.2 HYPERLIPIDEMIA, MIXED: Primary | ICD-10-CM

## 2025-04-18 DIAGNOSIS — E11.9 TYPE 2 DIABETES MELLITUS WITHOUT COMPLICATION, WITH LONG-TERM CURRENT USE OF INSULIN: ICD-10-CM

## 2025-04-18 DIAGNOSIS — Z79.4 TYPE 2 DIABETES MELLITUS WITHOUT COMPLICATION, WITH LONG-TERM CURRENT USE OF INSULIN: ICD-10-CM

## 2025-04-18 PROCEDURE — 99214 OFFICE O/P EST MOD 30 MIN: CPT | Performed by: INTERNAL MEDICINE

## 2025-04-18 PROCEDURE — 1160F RVW MEDS BY RX/DR IN RCRD: CPT | Performed by: INTERNAL MEDICINE

## 2025-04-18 PROCEDURE — 3074F SYST BP LT 130 MM HG: CPT | Performed by: INTERNAL MEDICINE

## 2025-04-18 PROCEDURE — 1159F MED LIST DOCD IN RCRD: CPT | Performed by: INTERNAL MEDICINE

## 2025-04-18 PROCEDURE — 1157F ADVNC CARE PLAN IN RCRD: CPT | Performed by: INTERNAL MEDICINE

## 2025-04-18 PROCEDURE — 3078F DIAST BP <80 MM HG: CPT | Performed by: INTERNAL MEDICINE

## 2025-04-18 RX ORDER — METFORMIN HYDROCHLORIDE 500 MG/1
1000 TABLET, EXTENDED RELEASE ORAL
Qty: 100 TABLET | Refills: 2 | Status: SHIPPED | OUTPATIENT
Start: 2025-04-18 | End: 2026-05-23

## 2025-04-18 NOTE — PROGRESS NOTES
Chief complaint   Routine evaluation.    HPI  Concerned about elevated glucose levels.Chief complaint       HPI  No specific complaints, headaches from time to time, uses tylenol prn.       ROS   Gen-no wt loss,wt gain,fevers,apnea at night,snoring  HEENT- no ear pain, no oral pain, no hearing loss no sinus pain or drainage  Pulm- no sob,weezing,cough,hemoptysis,productive mucous  Cardio-no cp,tyler,orthopnea,palpitations  Gi- no diarrhea,constipation,n,v,hematemesis,dysphagia,change in bowel habits  Endo- no polydipsia,polyuria,wt gain,extreme fatigue,polyphagia,blurred vision  Neuro-no ha,loc,paresthesias,transient limb weakness,acute memory loss,visual changes  Heme- no blood loss,transfusions,unusual bleeding from skin and/or mucosa  Urology- no dysuria,hematuria,urinary frequency,bladder pain  Psych- no depression,mood swings,anxiety,insomnia,hallucinations  Skin- no rash,new discolorations  Musculoskeletal- no joint aches / pains,muscle pains,new back pain  Vascular- no pain in calve w walkin,discolored painful toes        Vitals:    04/18/25 1155   BP: 110/78       Physical Exam  NAD  HEENT- nl ear tm bilat,nl pharynx, mucosa moist,no icterus  Lungs clear, good AE, no wheezing  Heart-normal S1-S2 no murmur gallop rub, rhythm normal  Abdomen- soft, nontender, pos bowel sounds,  Neck-no JVD, thyroid normal, no carotid bruits, no LN  Neuro alert and oriented ×3, cranial nerves II through XII normal, motor function symmetrical,sensory exam symmetrical ,gait nl  Extrem- no edema, no tenderness, no cords  Skin- nl turgor, no ulcers,no rash         Assessment/Plan   Diagnoses and all orders for this visit:  Hyperlipidemia, mixed  Atherosclerosis of native coronary artery of native heart without angina pectoris  Type 2 diabetes mellitus without complication, with long-term current use of insulin      Repeat chest ct- symptoms.    Start Glucophage 1gm

## 2025-04-23 DIAGNOSIS — I10 PRIMARY HYPERTENSION: Primary | ICD-10-CM

## 2025-05-23 DIAGNOSIS — I10 PRIMARY HYPERTENSION: ICD-10-CM

## 2025-06-04 DIAGNOSIS — I10 PRIMARY HYPERTENSION: Primary | ICD-10-CM

## 2025-06-04 LAB
ANION GAP SERPL CALCULATED.4IONS-SCNC: 13 MMOL/L (CALC) (ref 7–17)
BUN SERPL-MCNC: 22 MG/DL (ref 7–25)
BUN/CREAT SERPL: NORMAL (CALC) (ref 6–22)
CALCIUM SERPL-MCNC: 10.1 MG/DL (ref 8.6–10.3)
CHLORIDE SERPL-SCNC: 103 MMOL/L (ref 98–110)
CO2 SERPL-SCNC: 23 MMOL/L (ref 20–32)
CREAT SERPL-MCNC: 1.02 MG/DL (ref 0.7–1.35)
EGFRCR SERPLBLD CKD-EPI 2021: 82 ML/MIN/1.73M2
GLUCOSE SERPL-MCNC: 98 MG/DL (ref 65–99)
POTASSIUM SERPL-SCNC: 4.8 MMOL/L (ref 3.5–5.3)
SODIUM SERPL-SCNC: 139 MMOL/L (ref 135–146)

## 2025-06-05 DIAGNOSIS — E66.9 OBESITY (BMI 30-39.9): Primary | ICD-10-CM

## 2025-06-05 DIAGNOSIS — E78.2 HYPERLIPIDEMIA, MIXED: ICD-10-CM

## 2025-06-05 DIAGNOSIS — I10 PRIMARY HYPERTENSION: ICD-10-CM

## 2025-09-05 LAB
CHOLEST SERPL-MCNC: 78 MG/DL
CHOLEST/HDLC SERPL: 2.1 (CALC)
EST. AVERAGE GLUCOSE BLD GHB EST-MCNC: 126 MG/DL
EST. AVERAGE GLUCOSE BLD GHB EST-SCNC: 7 MMOL/L
HBA1C MFR BLD: 6 %
HDLC SERPL-MCNC: 37 MG/DL
LDLC SERPL CALC-MCNC: 27 MG/DL (CALC)
NONHDLC SERPL-MCNC: 41 MG/DL (CALC)
TRIGL SERPL-MCNC: 63 MG/DL

## 2025-09-18 ENCOUNTER — APPOINTMENT (OUTPATIENT)
Dept: RADIOLOGY | Facility: CLINIC | Age: 65
End: 2025-09-18
Payer: COMMERCIAL